# Patient Record
Sex: FEMALE | Race: WHITE | NOT HISPANIC OR LATINO | Employment: OTHER | URBAN - METROPOLITAN AREA
[De-identification: names, ages, dates, MRNs, and addresses within clinical notes are randomized per-mention and may not be internally consistent; named-entity substitution may affect disease eponyms.]

---

## 2017-01-13 ENCOUNTER — ALLSCRIPTS OFFICE VISIT (OUTPATIENT)
Dept: OTHER | Facility: OTHER | Age: 62
End: 2017-01-13

## 2017-01-13 DIAGNOSIS — Z12.31 ENCOUNTER FOR SCREENING MAMMOGRAM FOR MALIGNANT NEOPLASM OF BREAST: ICD-10-CM

## 2017-03-10 ENCOUNTER — HOSPITAL ENCOUNTER (OUTPATIENT)
Dept: MAMMOGRAPHY | Facility: HOSPITAL | Age: 62
Discharge: HOME | End: 2017-03-10
Attending: OBSTETRICS & GYNECOLOGY
Payer: COMMERCIAL

## 2017-03-10 DIAGNOSIS — Z12.31 ENCOUNTER FOR SCREENING MAMMOGRAM FOR MALIGNANT NEOPLASM OF BREAST: ICD-10-CM

## 2017-03-10 PROCEDURE — G0202 SCR MAMMO BI INCL CAD: HCPCS

## 2017-04-19 ENCOUNTER — APPOINTMENT (OUTPATIENT)
Dept: LAB | Facility: CLINIC | Age: 62
End: 2017-04-19
Payer: COMMERCIAL

## 2017-04-19 ENCOUNTER — TRANSCRIBE ORDERS (OUTPATIENT)
Dept: LAB | Facility: CLINIC | Age: 62
End: 2017-04-19

## 2017-04-19 DIAGNOSIS — Z00.00 ROUTINE GENERAL MEDICAL EXAMINATION AT A HEALTH CARE FACILITY: Primary | ICD-10-CM

## 2017-04-19 DIAGNOSIS — Z11.59 SCREENING EXAMINATION FOR POLIOMYELITIS: ICD-10-CM

## 2017-04-19 DIAGNOSIS — Z00.00 ROUTINE GENERAL MEDICAL EXAMINATION AT A HEALTH CARE FACILITY: ICD-10-CM

## 2017-04-19 LAB
CHOLEST SERPL-MCNC: 187 MG/DL (ref 50–200)
GLUCOSE P FAST SERPL-MCNC: 116 MG/DL (ref 65–99)
HCV AB SER QL: NORMAL
HDLC SERPL-MCNC: 64 MG/DL (ref 40–60)
LDLC SERPL CALC-MCNC: 101 MG/DL (ref 0–100)
TRIGL SERPL-MCNC: 112 MG/DL

## 2017-04-19 PROCEDURE — 86803 HEPATITIS C AB TEST: CPT

## 2017-04-19 PROCEDURE — 36415 COLL VENOUS BLD VENIPUNCTURE: CPT

## 2017-04-19 PROCEDURE — 82947 ASSAY GLUCOSE BLOOD QUANT: CPT

## 2017-04-19 PROCEDURE — 80061 LIPID PANEL: CPT

## 2017-07-20 ENCOUNTER — APPOINTMENT (OUTPATIENT)
Dept: LAB | Facility: CLINIC | Age: 62
End: 2017-07-20
Payer: COMMERCIAL

## 2017-07-20 ENCOUNTER — TRANSCRIBE ORDERS (OUTPATIENT)
Dept: LAB | Facility: CLINIC | Age: 62
End: 2017-07-20

## 2017-07-20 DIAGNOSIS — R19.4 FREQUENT BOWEL MOVEMENTS: ICD-10-CM

## 2017-07-20 DIAGNOSIS — R73.01 IMPAIRED FASTING GLUCOSE: Primary | ICD-10-CM

## 2017-07-20 DIAGNOSIS — R73.01 IMPAIRED FASTING GLUCOSE: ICD-10-CM

## 2017-07-20 LAB
BASOPHILS # BLD AUTO: 0.01 THOUSANDS/ΜL (ref 0–0.1)
BASOPHILS NFR BLD AUTO: 0 % (ref 0–1)
EOSINOPHIL # BLD AUTO: 0.09 THOUSAND/ΜL (ref 0–0.61)
EOSINOPHIL NFR BLD AUTO: 2 % (ref 0–6)
ERYTHROCYTE [DISTWIDTH] IN BLOOD BY AUTOMATED COUNT: 12.3 % (ref 11.6–15.1)
GLUCOSE P FAST SERPL-MCNC: 116 MG/DL (ref 65–99)
HCT VFR BLD AUTO: 40.7 % (ref 34.8–46.1)
HGB BLD-MCNC: 13.5 G/DL (ref 11.5–15.4)
LYMPHOCYTES # BLD AUTO: 2.37 THOUSANDS/ΜL (ref 0.6–4.47)
LYMPHOCYTES NFR BLD AUTO: 44 % (ref 14–44)
MCH RBC QN AUTO: 31.3 PG (ref 26.8–34.3)
MCHC RBC AUTO-ENTMCNC: 33.2 G/DL (ref 31.4–37.4)
MCV RBC AUTO: 94 FL (ref 82–98)
MONOCYTES # BLD AUTO: 0.43 THOUSAND/ΜL (ref 0.17–1.22)
MONOCYTES NFR BLD AUTO: 8 % (ref 4–12)
NEUTROPHILS # BLD AUTO: 2.49 THOUSANDS/ΜL (ref 1.85–7.62)
NEUTS SEG NFR BLD AUTO: 46 % (ref 43–75)
PLATELET # BLD AUTO: 242 THOUSANDS/UL (ref 149–390)
PMV BLD AUTO: 10 FL (ref 8.9–12.7)
RBC # BLD AUTO: 4.32 MILLION/UL (ref 3.81–5.12)
WBC # BLD AUTO: 5.39 THOUSAND/UL (ref 4.31–10.16)

## 2017-07-20 PROCEDURE — 85025 COMPLETE CBC W/AUTO DIFF WBC: CPT

## 2017-07-20 PROCEDURE — 36415 COLL VENOUS BLD VENIPUNCTURE: CPT

## 2017-07-20 PROCEDURE — 82947 ASSAY GLUCOSE BLOOD QUANT: CPT

## 2017-07-20 PROCEDURE — 83516 IMMUNOASSAY NONANTIBODY: CPT

## 2017-07-21 LAB — TTG IGA SER-ACNC: <2 U/ML (ref 0–3)

## 2017-08-22 ENCOUNTER — GENERIC CONVERSION - ENCOUNTER (OUTPATIENT)
Dept: OTHER | Facility: OTHER | Age: 62
End: 2017-08-22

## 2017-10-17 ENCOUNTER — GENERIC CONVERSION - ENCOUNTER (OUTPATIENT)
Dept: OTHER | Facility: OTHER | Age: 62
End: 2017-10-17

## 2017-10-17 ENCOUNTER — ANESTHESIA EVENT (OUTPATIENT)
Dept: GASTROENTEROLOGY | Facility: HOSPITAL | Age: 62
End: 2017-10-17
Payer: COMMERCIAL

## 2017-10-17 ENCOUNTER — ANESTHESIA (OUTPATIENT)
Dept: GASTROENTEROLOGY | Facility: HOSPITAL | Age: 62
End: 2017-10-17
Payer: COMMERCIAL

## 2017-10-17 ENCOUNTER — HOSPITAL ENCOUNTER (OUTPATIENT)
Facility: HOSPITAL | Age: 62
Setting detail: OUTPATIENT SURGERY
Discharge: HOME | End: 2017-10-17
Attending: INTERNAL MEDICINE | Admitting: INTERNAL MEDICINE
Payer: COMMERCIAL

## 2017-10-17 VITALS
RESPIRATION RATE: 16 BRPM | OXYGEN SATURATION: 100 % | SYSTOLIC BLOOD PRESSURE: 127 MMHG | HEART RATE: 68 BPM | TEMPERATURE: 97.8 F | WEIGHT: 139 LBS | DIASTOLIC BLOOD PRESSURE: 73 MMHG | HEIGHT: 64 IN | BODY MASS INDEX: 23.73 KG/M2

## 2017-10-17 DIAGNOSIS — Z12.11 ENCOUNTER FOR SCREENING FOR MALIGNANT NEOPLASM OF COLON: ICD-10-CM

## 2017-10-17 PROCEDURE — 88305 TISSUE EXAM BY PATHOLOGIST: CPT | Performed by: INTERNAL MEDICINE

## 2017-10-17 RX ORDER — SODIUM CHLORIDE 9 MG/ML
50 INJECTION, SOLUTION INTRAVENOUS CONTINUOUS
Status: DISCONTINUED | OUTPATIENT
Start: 2017-10-17 | End: 2017-10-17 | Stop reason: HOSPADM

## 2017-10-17 RX ORDER — FLUTICASONE PROPIONATE 50 MCG
1 SPRAY, SUSPENSION (ML) NASAL DAILY
COMMUNITY

## 2017-10-17 RX ORDER — MELATONIN
1000 DAILY
COMMUNITY

## 2017-10-17 RX ORDER — PROPOFOL 10 MG/ML
INJECTION, EMULSION INTRAVENOUS AS NEEDED
Status: DISCONTINUED | OUTPATIENT
Start: 2017-10-17 | End: 2017-10-17 | Stop reason: SURG

## 2017-10-17 RX ORDER — LORATADINE 10 MG/1
10 TABLET ORAL DAILY
COMMUNITY

## 2017-10-17 RX ORDER — PROPOFOL 10 MG/ML
INJECTION, EMULSION INTRAVENOUS CONTINUOUS PRN
Status: DISCONTINUED | OUTPATIENT
Start: 2017-10-17 | End: 2017-10-17 | Stop reason: SURG

## 2017-10-17 RX ADMIN — PROPOFOL 80 MCG/KG/MIN: 10 INJECTION, EMULSION INTRAVENOUS at 11:29

## 2017-10-17 RX ADMIN — PROPOFOL 20 MG: 10 INJECTION, EMULSION INTRAVENOUS at 11:35

## 2017-10-17 RX ADMIN — SODIUM CHLORIDE 50 ML/HR: 0.9 INJECTION, SOLUTION INTRAVENOUS at 09:53

## 2017-10-17 RX ADMIN — PROPOFOL 50 MG: 10 INJECTION, EMULSION INTRAVENOUS at 11:29

## 2017-10-17 NOTE — ANESTHESIA POSTPROCEDURE EVALUATION
Post-Op Assessment Note      CV Status:  Stable    Mental Status:  Alert and awake    Hydration Status:  Euvolemic    PONV Controlled:  Controlled    Airway Patency:  Patent    Post Op Vitals Reviewed: Yes          Staff: Anesthesiologist, CRNA           /75 (10/17/17 1152)    Temp      Pulse 86 (10/17/17 1152)   Resp 16 (10/17/17 1152)    SpO2 100 % (10/17/17 1152)

## 2017-10-17 NOTE — OP NOTE
**** GI/ENDOSCOPY REPORT ****     PATIENT NAME: LEANNE ESPANA ------ VISIT ID:  Patient ID: [id number]   YOB: 1955     INTRODUCTION: Colonoscopy - A 63 female patient presents for an outpatient   Colonoscopy at [facility]  PREVIOUS COLONOSCOPY:     INDICATIONS: Surveillance; previous colonoscopy was 3 or more years from   today  Screening-ADR  CONSENT:  The benefits, risks, and alternatives to the procedure were   discussed and informed consent was obtained from the patient  PREPARATION: EKG, pulse, pulse oximetry and blood pressure were monitored   throughout the procedure  The patient was identified by myself both   verbally and by visual inspection of ID band  Airway Assessment   Classification: Airway class 2 - Visualization of the soft palate, fauces   and uvula  ASA Classification: Class 2 - Patient has mild to moderate   systemic disturbance that may or may not be related to the disorder   requiring surgery  MEDICATIONS: Anesthesia-check records     PROCEDURE:  The endoscope was passed without difficulty through the anus   under direct visualization and advanced to the cecum, confirmed by   appendiceal orifice and ileocecal valve  The scope was withdrawn and the   mucosa was carefully examined  The quality of the preparation was  RECTAL EXAM: Normal rectal exam      FINDINGS:  A single flat polyp, measuring less than 5 mm in size, was   found in the ascending colon  The polyp was completely removed by cold   biopsy polypectomy  The polyp was retrieved  Internal hemorrhoids were   found in the rectum  COMPLICATIONS: There were no complications  IMPRESSIONS: A single flat polyp found in the ascending colon; removed by   cold biopsy polypectomy  Internal hemorrhoids in the rectum  RECOMMENDATIONS: Await biopsy results  Recommend patient have repeat   colonoscopy in five years likely based on pathology results       ESTIMATED BLOOD LOSS:     PATHOLOGY SPECIMENS: Completely removed by cold biopsy polypectomy  PROCEDURE CODES:     ICD-9 Codes: 211 3 Benign neoplasm of colon     ICD-10 Codes: K63 5 Polyp of colon K64 9 Unspecified hemorrhoids     PERFORMED BY: HEATHER Romero  on 10/17/2017  Version 1, electronically signed by HEATHER Maldonado  on   10/17/2017 at 12:17

## 2017-10-17 NOTE — ANESTHESIA PREPROCEDURE EVALUATION
Review of Systems/Medical History  Patient summary reviewed  Chart reviewed  No history of anesthetic complications     Cardiovascular  Exercise tolerance: good,     Pulmonary       GI/Hepatic            Endo/Other     GYN       Hematology   Musculoskeletal       Neurology   Psychology           Physical Exam    Airway    Mallampati score: II  TM Distance: >3 FB  Neck ROM: full     Dental   No notable dental hx     Cardiovascular      Pulmonary      Other Findings        Anesthesia Plan  ASA Score- 2       Anesthesia Type- IV sedation with anesthesia with ASA Monitors  Additional Monitors:   Airway Plan:           Induction- intravenous  Informed Consent- Anesthetic plan and risks discussed with patient  I personally reviewed this patient with the CRNA  Discussed and agreed on the Anesthesia Plan with the CRNA  Rosella Goldmann

## 2017-11-03 ENCOUNTER — GENERIC CONVERSION - ENCOUNTER (OUTPATIENT)
Dept: OTHER | Facility: OTHER | Age: 62
End: 2017-11-03

## 2018-01-11 NOTE — RESULT NOTES
Discussion/Summary   Polyp was a precancerous 1, I recommend repeat colonoscopy in 5 years     Verified Results  (1) TISSUE EXAM [id number] 11:38AM Diego Cruz     Test Name Result Flag Reference   LAB AP CASE REPORT (Report)     Surgical Pathology Report             Case: [id number]                   Authorizing Provider: Francisca Moore DO    Collected:      10/17/2017 1138        Ordering Location:   [address] [place] [address]   Received:      10/17/2017 Ag U  91  Endoscopy                               Pathologist:      Faith Kruger MD                                 Specimen:  Polyp, Colorectal, ascending colon   LAB AP FINAL DIAGNOSIS      A  Ascending colon polyp (biopsy):    - Portions of tubular adenoma  - No high-grade dysplasia or malignancy identified  Electronically signed by Faith Kruger MD on 10/19/2017 at 11:50 AM   LAB AP NOTE      Interpretation performed at [facility], [address] [place] [address], [address] [place] [address]  LAB AP SURGICAL ADDITIONAL INFORMATION (Report)     All controls performed with the immunohistochemical stains reported above   reacted appropriately  These tests were developed and their performance   characteristics determined by [facility] or   [facility]  They may not be cleared or approved by the [place]  Food and Drug Administration  The FDA has determined that such clearance   or approval is not necessary  These tests are used for clinical purposes  They should not be regarded as investigational or for research  This   laboratory has been approved by CLIA 88, designated as a high-complexity   laboratory and is qualified to perform these tests  LAB AP GROSS DESCRIPTION (Report)     A  The specimen is received in formalin, labeled with the patient's name   and hospital number, and is designated ascending colon  The specimen   consists of one tan-pink soft tissue fragment measuring 0 3 cm in greatest   dimension  Entirely submitted  One cassette  Note: The estimated total formalin fixation time based upon information   provided by the submitting clinician and the standard processing schedule   is under 72 hours   MAS   LAB AP CLINICAL INFORMATION      Encounter for screening for malignant neoplasm of colon

## 2018-01-13 VITALS
DIASTOLIC BLOOD PRESSURE: 80 MMHG | BODY MASS INDEX: 25.83 KG/M2 | WEIGHT: 155 LBS | OXYGEN SATURATION: 99 % | SYSTOLIC BLOOD PRESSURE: 132 MMHG | HEART RATE: 66 BPM | HEIGHT: 65 IN

## 2018-01-13 NOTE — MISCELLANEOUS
Dear Dr Miesha Cintron,    The polyp I removed from Mrs Hernandez was a precancerous one  Based on this I recommend a repeat colonoscopy in 5 years    She will be placed on my recall list         Sincerely,         Dylon Mayo DO      Electronically signed by:Ashley Bella DO  Nov  3 2017  3:23PM EST

## 2018-01-16 ENCOUNTER — GENERIC CONVERSION - ENCOUNTER (OUTPATIENT)
Dept: OTHER | Facility: OTHER | Age: 63
End: 2018-01-16

## 2018-01-24 VITALS
HEIGHT: 65 IN | SYSTOLIC BLOOD PRESSURE: 114 MMHG | DIASTOLIC BLOOD PRESSURE: 72 MMHG | WEIGHT: 133.13 LBS | BODY MASS INDEX: 22.18 KG/M2

## 2018-03-10 DIAGNOSIS — Z12.31 ENCOUNTER FOR SCREENING MAMMOGRAM FOR MALIGNANT NEOPLASM OF BREAST: ICD-10-CM

## 2018-03-21 ENCOUNTER — HOSPITAL ENCOUNTER (OUTPATIENT)
Dept: MAMMOGRAPHY | Facility: HOSPITAL | Age: 63
Discharge: HOME | End: 2018-03-21
Attending: OBSTETRICS & GYNECOLOGY
Payer: COMMERCIAL

## 2018-03-21 DIAGNOSIS — Z12.31 ENCOUNTER FOR SCREENING MAMMOGRAM FOR MALIGNANT NEOPLASM OF BREAST: ICD-10-CM

## 2018-03-21 PROCEDURE — 77063 BREAST TOMOSYNTHESIS BI: CPT

## 2018-03-21 PROCEDURE — 77067 SCR MAMMO BI INCL CAD: CPT

## 2018-04-16 ENCOUNTER — OFFICE VISIT (OUTPATIENT)
Dept: URGENT CARE | Age: 63
End: 2018-04-16
Payer: COMMERCIAL

## 2018-04-16 VITALS
HEART RATE: 88 BPM | SYSTOLIC BLOOD PRESSURE: 106 MMHG | OXYGEN SATURATION: 100 % | DIASTOLIC BLOOD PRESSURE: 69 MMHG | WEIGHT: 130 LBS | BODY MASS INDEX: 22.2 KG/M2 | RESPIRATION RATE: 18 BRPM | HEIGHT: 64 IN | TEMPERATURE: 98.9 F

## 2018-04-16 DIAGNOSIS — J06.9 ACUTE URI: Primary | ICD-10-CM

## 2018-04-16 PROCEDURE — 99213 OFFICE O/P EST LOW 20 MIN: CPT | Performed by: FAMILY MEDICINE

## 2018-04-16 RX ORDER — BENZONATATE 100 MG/1
100 CAPSULE ORAL 3 TIMES DAILY PRN
Qty: 20 CAPSULE | Refills: 0 | Status: SHIPPED | OUTPATIENT
Start: 2018-04-16 | End: 2019-01-18 | Stop reason: ALTCHOICE

## 2018-04-16 NOTE — PATIENT INSTRUCTIONS
As we discussed your illness is viral   No antibiotics are indicated at this time  Symptoms do not appear to be consistent with influenza and you have had your symptoms for greater than 48 hours  There is no benefit of testing and starting Tamiflu  Recommend supportive measures at this time  Rest and drink extra fluids  OTC cough and cold medications as needed  Tylenol or Motrin as needed for pain or fever  Salt water gargles and throat lozenges for sore throat  Follow up with PCP if no improvement over next 2-3 days  Go to ER with worsening symptoms  Cold Symptoms   WHAT YOU NEED TO KNOW:   A cold is an infection caused by a virus  The infection causes your upper respiratory system to become inflamed  Common symptoms of a cold include sneezing, dry throat, a stuffy nose, headache, watery eyes, and a cough  Your cough may be dry, or you may cough up mucus  You may also have muscle aches, joint pain, and tiredness  Rarely, you may have a fever  Most colds go away without treatment  DISCHARGE INSTRUCTIONS:   Return to the emergency department if:   · You have increased tiredness and weakness  · You are unable to eat  · Your heart is beating much faster than usual for you  · You see white spots in the back of your throat and your neck is swollen and sore to the touch  · You see pinpoint or larger reddish-purple dots on your skin  Contact your healthcare provider if:   · You have a fever higher than 102°F (38 9°C)  · You have new or worsening shortness of breath  · You have thick nasal drainage for more than 2 days  · Your symptoms do not improve or get worse within 5 days  · You have questions or concerns about your condition or care  Medicines: The following medicines may be suggested by your healthcare provider to decrease your cold symptoms  These medicines are available without a doctor's order  Ask which medicines to take and when to take them   Follow directions  · NSAIDs or acetaminophen  help to bring down a fever or decrease pain  · Decongestants  help decrease nasal stuffiness  · Antihistamines  help decrease sneezing and a runny nose  · Cough suppressants  help decrease how much you cough  · Expectorants  help loosen mucus so you can cough it up  · Take your medicine as directed  Contact your healthcare provider if you think your medicine is not helping or if you have side effects  Tell him of her if you are allergic to any medicine  Keep a list of the medicines, vitamins, and herbs you take  Include the amounts, and when and why you take them  Bring the list or the pill bottles to follow-up visits  Carry your medicine list with you in case of an emergency  Symptom relief: The following may help relieve cold symptoms, such as a dry throat and congestion:  · Gargle with mouthwash or warm salt water as directed  · Suck on throat lozenges or hard candy  · Use a cold or warm vaporizer or humidifier to ease your breathing  · Rest for at least 2 days and then as needed to decrease tiredness and weakness  · Use petroleum based jelly around your nostrils to decrease irritation from blowing your nose  Drink liquids:  Liquids will help thin and loosen thick mucus so you can cough it up  Liquids will also keep you hydrated  Ask your healthcare provider which liquids are best for you and how much to drink each day  Prevent the spread of germs: You can spread your cold germs to others for at least 3 days after your symptoms start  Wash your hands often  Do not share items, such as eating utensils  Cover your nose and mouth when you cough or sneeze using the crook of your elbow instead of your hands  Throw used tissues in the garbage  Do not smoke:  Smoking may worsen your symptoms and increase the length of time you feel sick  Talk with your healthcare provider if you need help to stop smoking    Follow up with your healthcare provider as directed:  Write down your questions so you remember to ask them during your visits  © 2017 [employer] Information is for End User's use only and may not be sold, redistributed or otherwise used for commercial purposes  All illustrations and images included in CareNotes® are the copyrighted property of [employer]  or [facility]  The above information is an  only  It is not intended as medical advice for individual conditions or treatments  Talk to your doctor, nurse or pharmacist before following any medical regimen to see if it is safe and effective for you

## 2018-04-16 NOTE — LETTER
April 16, 2018     Patient: Digna Mares   YOB: 1955   Date of Visit: 4/16/2018       To Whom It May Concern: It is my medical opinion, it is recommended that Digna Mares may return to work on 04/18/2018  If you have any questions or concerns, please don't hesitate to call           Sincerely,        [facility]    CC: No Recipients

## 2018-04-16 NOTE — PROGRESS NOTES
[facility]        NAME: Ana Maria Humphrey is a 63 y o  female  : 1955    MRN: [id number]  DATE: 2018  TIME: 9:58 AM    Assessment and Plan   Acute URI [J06 9]  1  Acute URI  benzonatate (TESSALON PERLES) 100 mg capsule         Patient Instructions     Patient Instructions   As we discussed your illness is viral   No antibiotics are indicated at this time  Symptoms do not appear to be consistent with influenza and you have had your symptoms for greater than 48 hours  There is no benefit of testing and starting Tamiflu  Recommend supportive measures at this time  Rest and drink extra fluids  OTC cough and cold medications as needed  Tylenol or Motrin as needed for pain or fever  Salt water gargles and throat lozenges for sore throat  Follow up with PCP if no improvement over next 2-3 days  Go to ER with worsening symptoms  Chief Complaint     Chief Complaint   Patient presents with    Cold Like Symptoms         History of Present Illness   Ana Maria Humphrey presents to the clinic c/o    This is a 63year old female here today with cough, congestion, body aches  She states symptoms started 2-3 days ago with allergy like symptoms  She had sinus congestion  She states today she woke up with cough and body aches  She denies fever  She complains of ear pain  She states her  had the flu about 1 week ago  She did have her influenza vaccine  She states cough is dry and non productive  Review of Systems   Review of Systems   Constitutional: Positive for activity change and chills  Negative for fatigue and fever  HENT: Positive for congestion, ear pain, sinus pain and sinus pressure  Respiratory: Positive for cough  Negative for chest tightness, shortness of breath and wheezing  Cardiovascular: Negative  Neurological: Negative  Psychiatric/Behavioral: Negative            Current Medications     Long-Term Prescriptions   Medication Sig Dispense Refill    cholecalciferol (VITAMIN D3) 1,000 units tablet Take 1,000 Units by mouth daily      fluticasone (FLONASE) 50 mcg/act nasal spray 1 spray into each nostril daily      loratadine (CLARITIN) 10 mg tablet Take 10 mg by mouth daily         Current Allergies     Allergies as of 04/16/2018 - Reviewed 04/16/2018   Allergen Reaction Noted    Percocet [oxycodone-acetaminophen] GI Intolerance 10/17/2017    Septra [sulfamethoxazole-trimethoprim] GI Intolerance 10/17/2017            The following portions of the patient's history were reviewed and updated as appropriate: allergies, current medications, past family history, past medical history, past social history, past surgical history and problem list     Objective   /69   Pulse 88   Temp 98 9 °F (37 2 °C) (Temporal)   Resp 18   Ht 5' 4" (1 626 m)   Wt 59 kg (130 lb)   LMP  (Exact Date)   SpO2 100%   BMI 22 31 kg/m²        Physical Exam     Physical Exam   Constitutional: She is oriented to person, place, and time  She appears well-developed and well-nourished  HENT:   Right Ear: External ear normal    Left Ear: External ear normal    Serous fluid bilateral ears    Neck: Normal range of motion  Neck supple  Cardiovascular: Normal rate, regular rhythm and normal heart sounds  Pulmonary/Chest: Effort normal    Neurological: She is alert and oriented to person, place, and time  Skin: Skin is warm and dry  Psychiatric: She has a normal mood and affect  Her behavior is normal    Nursing note and vitals reviewed

## 2018-05-17 ENCOUNTER — TRANSCRIBE ORDERS (OUTPATIENT)
Dept: LAB | Facility: CLINIC | Age: 63
End: 2018-05-17

## 2018-05-17 ENCOUNTER — APPOINTMENT (OUTPATIENT)
Dept: LAB | Facility: CLINIC | Age: 63
End: 2018-05-17
Payer: COMMERCIAL

## 2018-05-17 DIAGNOSIS — R73.01 IMPAIRED FASTING GLUCOSE: ICD-10-CM

## 2018-05-17 DIAGNOSIS — R73.01 IMPAIRED FASTING GLUCOSE: Primary | ICD-10-CM

## 2018-05-17 LAB
ANION GAP SERPL CALCULATED.3IONS-SCNC: 9 MMOL/L (ref 4–13)
BUN SERPL-MCNC: 16 MG/DL (ref 5–25)
CALCIUM SERPL-MCNC: 9.2 MG/DL (ref 8.3–10.1)
CHLORIDE SERPL-SCNC: 103 MMOL/L (ref 100–108)
CHOLEST SERPL-MCNC: 159 MG/DL (ref 50–200)
CO2 SERPL-SCNC: 29 MMOL/L (ref 21–32)
CREAT SERPL-MCNC: 0.67 MG/DL (ref 0.6–1.3)
EST. AVERAGE GLUCOSE BLD GHB EST-MCNC: 114 MG/DL
GFR SERPL CREATININE-BSD FRML MDRD: 96 ML/MIN/1.73SQ M
GLUCOSE P FAST SERPL-MCNC: 102 MG/DL (ref 65–99)
HBA1C MFR BLD: 5.6 % (ref 4.2–6.3)
HDLC SERPL-MCNC: 67 MG/DL (ref 40–60)
LDLC SERPL CALC-MCNC: 78 MG/DL (ref 0–100)
NONHDLC SERPL-MCNC: 92 MG/DL
POTASSIUM SERPL-SCNC: 3.8 MMOL/L (ref 3.5–5.3)
SODIUM SERPL-SCNC: 141 MMOL/L (ref 136–145)
TRIGL SERPL-MCNC: 72 MG/DL

## 2018-05-17 PROCEDURE — 36415 COLL VENOUS BLD VENIPUNCTURE: CPT

## 2018-05-17 PROCEDURE — 83036 HEMOGLOBIN GLYCOSYLATED A1C: CPT

## 2018-05-17 PROCEDURE — 80061 LIPID PANEL: CPT

## 2018-05-17 PROCEDURE — 80048 BASIC METABOLIC PNL TOTAL CA: CPT

## 2019-01-18 ENCOUNTER — ANNUAL EXAM (OUTPATIENT)
Dept: OBGYN CLINIC | Facility: CLINIC | Age: 64
End: 2019-01-18
Payer: COMMERCIAL

## 2019-01-18 VITALS
HEIGHT: 64 IN | DIASTOLIC BLOOD PRESSURE: 70 MMHG | SYSTOLIC BLOOD PRESSURE: 112 MMHG | WEIGHT: 132.8 LBS | BODY MASS INDEX: 22.67 KG/M2

## 2019-01-18 DIAGNOSIS — Z12.31 ENCOUNTER FOR SCREENING MAMMOGRAM FOR MALIGNANT NEOPLASM OF BREAST: ICD-10-CM

## 2019-01-18 DIAGNOSIS — Z01.419 WELL FEMALE EXAM WITH ROUTINE GYNECOLOGICAL EXAM: Primary | ICD-10-CM

## 2019-01-18 PROCEDURE — S0612 ANNUAL GYNECOLOGICAL EXAMINA: HCPCS | Performed by: OBSTETRICS & GYNECOLOGY

## 2019-01-18 NOTE — PROGRESS NOTES
ASSESSMENT & PLAN: Yogesh Mtz is a 66 y o  [id number] with normal gynecologic exam     1   Routine well woman exam done today  2    Pap and HPV:Pap with HPV was not done today  Current ASCCP Guidelines reviewed  History of benign hysterectomy  3  Mammogram ordered  Recommend yearly mammography  4   Colonoscopy up-to-date  5  The patient is not sexually active  6  The following were reviewed in today's visit: breast self exam, mammography screening ordered, menopause, osteoporosis, adequate intake of calcium and vitamin D, exercise and healthy diet  7  Patient to return to office in 12 months for annual      All questions have been answered to her satisfaction  CC:  Annual Gynecologic Examination    HPI: Yogesh Mtz is a 66 y o  [id number] who presents for annual gynecologic examination  She has the following concerns:  None    Health Maintenance:    She exercises 4 days per week  She wears her seatbelt routinely  She does perform regular monthly self breast exams  She feels safe at home  Patients does follow a healthy diet  Past Medical History:   Diagnosis Date    Encounter for screening colonoscopy     Fibroid     Seasonal allergies        Past Surgical History:   Procedure Laterality Date    APPENDECTOMY      COLONOSCOPY      HYSTERECTOMY      KNEE ARTHROSCOPY Right     VT COLONOSCOPY FLX DX W/COLLJ SPEC WHEN PFRMD N/A 10/17/2017    Procedure: COLONOSCOPY;  Surgeon: Vitaliy Pickering DO;  Location: [facility]; Service: Gastroenterology    THUMB ARTHROSCOPY      TONSILLECTOMY      WISDOM TOOTH EXTRACTION         Past OB/Gyn History:   No LMP recorded  Patient has had a hysterectomy  Menstrual History:  OB History      Para Term  AB Living    4 3 3   1 3    SAB TAB Ectopic Multiple Live Births    1       3      Menstrual history: Patient is post menopausal    History of sexually transmitted infection No  Patient is not currently sexually active    heterosexual Birth control: postmenopausal      Family History   Problem Relation Age of Onset    No Known Problems Mother     Hypertension Father     Heart attack Father     Melanoma Father     Melanoma Sister        Social History:  Social History     Social History    Marital status: /Civil Union     Spouse name: N/A    Number of children: N/A    Years of education: N/A     Occupational History    Not on file  Social History Main Topics    Smoking status: Never Smoker    Smokeless tobacco: Never Used    Alcohol use No    Drug use: No    Sexual activity: Not Currently     Other Topics Concern    Not on file     Social History Narrative    No narrative on file     Presently lives with family  Patient is currently retired   Allergies   Allergen Reactions    Percocet [Oxycodone-Acetaminophen] GI Intolerance    Septra [Sulfamethoxazole-Trimethoprim] GI Intolerance       Current Outpatient Prescriptions:     cholecalciferol (VITAMIN D3) 1,000 units tablet, Take 1,000 Units by mouth daily, Disp: , Rfl:     fluticasone (FLONASE) 50 mcg/act nasal spray, 1 spray into each nostril daily, Disp: , Rfl:     loratadine (CLARITIN) 10 mg tablet, Take 10 mg by mouth daily, Disp: , Rfl:     Multiple Vitamin (CALCIUM COMPLEX PO), Take 1 tablet by mouth daily, Disp: , Rfl:     Review of Systems:  Review of Systems   Constitutional: Negative  Respiratory: Negative  Cardiovascular: Negative  Gastrointestinal: Negative  Genitourinary: Negative  Neurological: Negative  Psychiatric/Behavioral: Negative  Physical Exam:  /70 (BP Location: Right arm, Patient Position: Sitting, Cuff Size: Standard)   Ht 5' 4 25" (1 632 m)   Wt 60 2 kg (132 lb 12 8 oz)   BMI 22 62 kg/m²    Physical Exam   Constitutional: She is oriented to person, place, and time  She appears well-developed and well-nourished  No distress     Genitourinary: Vagina normal  There is no rash, tenderness, lesion or injury on the right labia  There is no rash, tenderness, lesion or injury on the left labia  Vagina exhibits rugosity  No tenderness or bleeding in the vagina  No vaginal discharge found  Right adnexum does not display mass, does not display tenderness and does not display fullness  Left adnexum does not display mass, does not display tenderness and does not display fullness  Genitourinary Comments: Surgically absent cervix and uterus   HENT:   Head: Normocephalic and atraumatic  Cardiovascular: Normal rate, regular rhythm and normal heart sounds  No murmur heard  Pulmonary/Chest: Effort normal and breath sounds normal  No respiratory distress  Abdominal: Soft  She exhibits no distension  There is no tenderness  Neurological: She is alert and oriented to person, place, and time  Skin: Skin is warm and dry  She is not diaphoretic  Nursing note and vitals reviewed

## 2019-04-10 ENCOUNTER — HOSPITAL ENCOUNTER (OUTPATIENT)
Dept: MAMMOGRAPHY | Facility: HOSPITAL | Age: 64
Discharge: HOME | End: 2019-04-10
Attending: OBSTETRICS & GYNECOLOGY
Payer: COMMERCIAL

## 2019-04-10 VITALS — HEIGHT: 64 IN | WEIGHT: 132 LBS | BODY MASS INDEX: 22.53 KG/M2

## 2019-04-10 DIAGNOSIS — Z12.31 ENCOUNTER FOR SCREENING MAMMOGRAM FOR MALIGNANT NEOPLASM OF BREAST: ICD-10-CM

## 2019-04-10 PROCEDURE — 77067 SCR MAMMO BI INCL CAD: CPT

## 2019-04-10 PROCEDURE — 77063 BREAST TOMOSYNTHESIS BI: CPT

## 2019-07-19 ENCOUNTER — APPOINTMENT (OUTPATIENT)
Dept: LAB | Facility: CLINIC | Age: 64
End: 2019-07-19
Payer: COMMERCIAL

## 2019-07-19 ENCOUNTER — TRANSCRIBE ORDERS (OUTPATIENT)
Dept: LAB | Facility: CLINIC | Age: 64
End: 2019-07-19

## 2019-07-19 DIAGNOSIS — R73.01 IMPAIRED FASTING GLUCOSE: Primary | ICD-10-CM

## 2019-07-19 DIAGNOSIS — R53.83 FATIGUE, UNSPECIFIED TYPE: ICD-10-CM

## 2019-07-19 DIAGNOSIS — R73.01 IMPAIRED FASTING GLUCOSE: ICD-10-CM

## 2019-07-19 LAB
ANION GAP SERPL CALCULATED.3IONS-SCNC: 7 MMOL/L (ref 4–13)
BASOPHILS # BLD AUTO: 0.01 THOUSANDS/ΜL (ref 0–0.1)
BASOPHILS NFR BLD AUTO: 0 % (ref 0–1)
BUN SERPL-MCNC: 17 MG/DL (ref 5–25)
CALCIUM SERPL-MCNC: 8.8 MG/DL (ref 8.3–10.1)
CHLORIDE SERPL-SCNC: 100 MMOL/L (ref 100–108)
CHOLEST SERPL-MCNC: 157 MG/DL (ref 50–200)
CO2 SERPL-SCNC: 28 MMOL/L (ref 21–32)
CREAT SERPL-MCNC: 0.66 MG/DL (ref 0.6–1.3)
EOSINOPHIL # BLD AUTO: 0.06 THOUSAND/ΜL (ref 0–0.61)
EOSINOPHIL NFR BLD AUTO: 1 % (ref 0–6)
ERYTHROCYTE [DISTWIDTH] IN BLOOD BY AUTOMATED COUNT: 11.9 % (ref 11.6–15.1)
EST. AVERAGE GLUCOSE BLD GHB EST-MCNC: 114 MG/DL
GFR SERPL CREATININE-BSD FRML MDRD: 96 ML/MIN/1.73SQ M
GLUCOSE P FAST SERPL-MCNC: 94 MG/DL (ref 65–99)
HBA1C MFR BLD: 5.6 % (ref 4.2–6.3)
HCT VFR BLD AUTO: 40.3 % (ref 34.8–46.1)
HDLC SERPL-MCNC: 72 MG/DL (ref 40–60)
HGB BLD-MCNC: 13.5 G/DL (ref 11.5–15.4)
IMM GRANULOCYTES # BLD AUTO: 0 THOUSAND/UL (ref 0–0.2)
IMM GRANULOCYTES NFR BLD AUTO: 0 % (ref 0–2)
LDLC SERPL CALC-MCNC: 75 MG/DL (ref 0–100)
LYMPHOCYTES # BLD AUTO: 1.81 THOUSANDS/ΜL (ref 0.6–4.47)
LYMPHOCYTES NFR BLD AUTO: 42 % (ref 14–44)
MCH RBC QN AUTO: 32.6 PG (ref 26.8–34.3)
MCHC RBC AUTO-ENTMCNC: 33.5 G/DL (ref 31.4–37.4)
MCV RBC AUTO: 97 FL (ref 82–98)
MONOCYTES # BLD AUTO: 0.46 THOUSAND/ΜL (ref 0.17–1.22)
MONOCYTES NFR BLD AUTO: 11 % (ref 4–12)
NEUTROPHILS # BLD AUTO: 1.97 THOUSANDS/ΜL (ref 1.85–7.62)
NEUTS SEG NFR BLD AUTO: 46 % (ref 43–75)
NONHDLC SERPL-MCNC: 85 MG/DL
NRBC BLD AUTO-RTO: 0 /100 WBCS
PLATELET # BLD AUTO: 222 THOUSANDS/UL (ref 149–390)
PMV BLD AUTO: 10.4 FL (ref 8.9–12.7)
POTASSIUM SERPL-SCNC: 4 MMOL/L (ref 3.5–5.3)
RBC # BLD AUTO: 4.14 MILLION/UL (ref 3.81–5.12)
SODIUM SERPL-SCNC: 135 MMOL/L (ref 136–145)
TRIGL SERPL-MCNC: 48 MG/DL
TSH SERPL DL<=0.05 MIU/L-ACNC: 5.81 UIU/ML (ref 0.36–3.74)
WBC # BLD AUTO: 4.31 THOUSAND/UL (ref 4.31–10.16)

## 2019-07-19 PROCEDURE — 80061 LIPID PANEL: CPT

## 2019-07-19 PROCEDURE — 84443 ASSAY THYROID STIM HORMONE: CPT

## 2019-07-19 PROCEDURE — 80048 BASIC METABOLIC PNL TOTAL CA: CPT

## 2019-07-19 PROCEDURE — 83036 HEMOGLOBIN GLYCOSYLATED A1C: CPT | Performed by: INTERNAL MEDICINE

## 2019-07-19 PROCEDURE — 85025 COMPLETE CBC W/AUTO DIFF WBC: CPT

## 2019-07-19 PROCEDURE — 36415 COLL VENOUS BLD VENIPUNCTURE: CPT

## 2020-03-25 ENCOUNTER — TRANSCRIBE ORDERS (OUTPATIENT)
Dept: ADMINISTRATIVE | Facility: HOSPITAL | Age: 65
End: 2020-03-25

## 2020-03-25 DIAGNOSIS — Z12.31 ENCOUNTER FOR SCREENING MAMMOGRAM FOR MALIGNANT NEOPLASM OF BREAST: Primary | ICD-10-CM

## 2020-10-27 DIAGNOSIS — R05.9 COUGH: ICD-10-CM

## 2020-10-27 PROCEDURE — U0003 INFECTIOUS AGENT DETECTION BY NUCLEIC ACID (DNA OR RNA); SEVERE ACUTE RESPIRATORY SYNDROME CORONAVIRUS 2 (SARS-COV-2) (CORONAVIRUS DISEASE [COVID-19]), AMPLIFIED PROBE TECHNIQUE, MAKING USE OF HIGH THROUGHPUT TECHNOLOGIES AS DESCRIBED BY CMS-2020-01-R: HCPCS | Performed by: INTERNAL MEDICINE

## 2020-10-28 LAB — SARS-COV-2 RNA SPEC QL NAA+PROBE: NOT DETECTED

## 2020-11-09 ENCOUNTER — HOSPITAL ENCOUNTER (OUTPATIENT)
Dept: MAMMOGRAPHY | Facility: HOSPITAL | Age: 65
Discharge: HOME | End: 2020-11-09
Payer: COMMERCIAL

## 2020-11-09 VITALS — HEIGHT: 64 IN | BODY MASS INDEX: 22.53 KG/M2 | WEIGHT: 132 LBS

## 2020-11-09 DIAGNOSIS — Z12.31 ENCOUNTER FOR SCREENING MAMMOGRAM FOR MALIGNANT NEOPLASM OF BREAST: ICD-10-CM

## 2020-11-09 PROCEDURE — 77067 SCR MAMMO BI INCL CAD: CPT

## 2020-11-09 PROCEDURE — 77063 BREAST TOMOSYNTHESIS BI: CPT

## 2021-03-27 ENCOUNTER — IMMUNIZATIONS (OUTPATIENT)
Dept: FAMILY MEDICINE CLINIC | Facility: HOSPITAL | Age: 66
End: 2021-03-27

## 2021-03-27 DIAGNOSIS — Z23 ENCOUNTER FOR IMMUNIZATION: Primary | ICD-10-CM

## 2021-03-27 PROCEDURE — 0001A SARS-COV-2 / COVID-19 MRNA VACCINE (PFIZER-BIONTECH) 30 MCG: CPT

## 2021-03-27 PROCEDURE — 91300 SARS-COV-2 / COVID-19 MRNA VACCINE (PFIZER-BIONTECH) 30 MCG: CPT

## 2021-04-17 ENCOUNTER — IMMUNIZATIONS (OUTPATIENT)
Dept: FAMILY MEDICINE CLINIC | Facility: HOSPITAL | Age: 66
End: 2021-04-17

## 2021-04-17 DIAGNOSIS — Z23 ENCOUNTER FOR IMMUNIZATION: Primary | ICD-10-CM

## 2021-04-17 PROCEDURE — 91300 SARS-COV-2 / COVID-19 MRNA VACCINE (PFIZER-BIONTECH) 30 MCG: CPT

## 2021-04-17 PROCEDURE — 0002A SARS-COV-2 / COVID-19 MRNA VACCINE (PFIZER-BIONTECH) 30 MCG: CPT

## 2021-05-10 DIAGNOSIS — R05.9 COUGH: ICD-10-CM

## 2021-05-10 LAB — SARS-COV-2 RNA RESP QL NAA+PROBE: NEGATIVE

## 2021-05-10 PROCEDURE — U0005 INFEC AGEN DETEC AMPLI PROBE: HCPCS | Performed by: INTERNAL MEDICINE

## 2021-05-10 PROCEDURE — U0003 INFECTIOUS AGENT DETECTION BY NUCLEIC ACID (DNA OR RNA); SEVERE ACUTE RESPIRATORY SYNDROME CORONAVIRUS 2 (SARS-COV-2) (CORONAVIRUS DISEASE [COVID-19]), AMPLIFIED PROBE TECHNIQUE, MAKING USE OF HIGH THROUGHPUT TECHNOLOGIES AS DESCRIBED BY CMS-2020-01-R: HCPCS | Performed by: INTERNAL MEDICINE

## 2021-10-13 ENCOUNTER — APPOINTMENT (OUTPATIENT)
Dept: LAB | Facility: CLINIC | Age: 66
End: 2021-10-13
Payer: COMMERCIAL

## 2021-10-13 DIAGNOSIS — R73.01 IMPAIRED FASTING GLUCOSE: ICD-10-CM

## 2021-10-13 DIAGNOSIS — R79.89 HYPOURICEMIA: ICD-10-CM

## 2021-10-13 LAB
ANION GAP SERPL CALCULATED.3IONS-SCNC: 8 MMOL/L (ref 4–13)
BUN SERPL-MCNC: 16 MG/DL (ref 5–25)
CALCIUM SERPL-MCNC: 8.9 MG/DL (ref 8.3–10.1)
CHLORIDE SERPL-SCNC: 103 MMOL/L (ref 100–108)
CHOLEST SERPL-MCNC: 205 MG/DL (ref 50–200)
CO2 SERPL-SCNC: 26 MMOL/L (ref 21–32)
CREAT SERPL-MCNC: 0.63 MG/DL (ref 0.6–1.3)
EST. AVERAGE GLUCOSE BLD GHB EST-MCNC: 114 MG/DL
GFR SERPL CREATININE-BSD FRML MDRD: 95 ML/MIN/1.73SQ M
GLUCOSE P FAST SERPL-MCNC: 108 MG/DL (ref 65–99)
HBA1C MFR BLD: 5.6 %
HDLC SERPL-MCNC: 89 MG/DL
LDLC SERPL CALC-MCNC: 105 MG/DL (ref 0–100)
NONHDLC SERPL-MCNC: 116 MG/DL
POTASSIUM SERPL-SCNC: 4.2 MMOL/L (ref 3.5–5.3)
SODIUM SERPL-SCNC: 137 MMOL/L (ref 136–145)
T4 FREE SERPL-MCNC: 0.81 NG/DL (ref 0.76–1.46)
TRIGL SERPL-MCNC: 57 MG/DL
TSH SERPL DL<=0.05 MIU/L-ACNC: 7.34 UIU/ML (ref 0.36–3.74)

## 2021-10-13 PROCEDURE — 80048 BASIC METABOLIC PNL TOTAL CA: CPT

## 2021-10-13 PROCEDURE — 36415 COLL VENOUS BLD VENIPUNCTURE: CPT

## 2021-10-13 PROCEDURE — 83036 HEMOGLOBIN GLYCOSYLATED A1C: CPT

## 2021-10-13 PROCEDURE — 84439 ASSAY OF FREE THYROXINE: CPT

## 2021-10-13 PROCEDURE — 84443 ASSAY THYROID STIM HORMONE: CPT

## 2021-10-13 PROCEDURE — 80061 LIPID PANEL: CPT

## 2021-12-01 PROCEDURE — U0005 INFEC AGEN DETEC AMPLI PROBE: HCPCS | Performed by: INTERNAL MEDICINE

## 2021-12-01 PROCEDURE — U0003 INFECTIOUS AGENT DETECTION BY NUCLEIC ACID (DNA OR RNA); SEVERE ACUTE RESPIRATORY SYNDROME CORONAVIRUS 2 (SARS-COV-2) (CORONAVIRUS DISEASE [COVID-19]), AMPLIFIED PROBE TECHNIQUE, MAKING USE OF HIGH THROUGHPUT TECHNOLOGIES AS DESCRIBED BY CMS-2020-01-R: HCPCS | Performed by: INTERNAL MEDICINE

## 2021-12-14 ENCOUNTER — IMMUNIZATIONS (OUTPATIENT)
Dept: FAMILY MEDICINE CLINIC | Facility: HOSPITAL | Age: 66
End: 2021-12-14

## 2021-12-14 DIAGNOSIS — Z23 ENCOUNTER FOR IMMUNIZATION: Primary | ICD-10-CM

## 2021-12-14 PROCEDURE — 91300 COVID-19 PFIZER VACC 0.3 ML: CPT

## 2021-12-14 PROCEDURE — 0001A COVID-19 PFIZER VACC 0.3 ML: CPT

## 2021-12-20 ENCOUNTER — APPOINTMENT (OUTPATIENT)
Dept: LAB | Facility: CLINIC | Age: 66
End: 2021-12-20
Payer: COMMERCIAL

## 2021-12-20 DIAGNOSIS — E03.9 HYPOTHYROIDISM, ADULT: ICD-10-CM

## 2021-12-20 LAB
T4 FREE SERPL-MCNC: 1.09 NG/DL (ref 0.76–1.46)
TSH SERPL DL<=0.05 MIU/L-ACNC: 2.13 UIU/ML (ref 0.36–3.74)

## 2021-12-20 PROCEDURE — 36415 COLL VENOUS BLD VENIPUNCTURE: CPT

## 2021-12-20 PROCEDURE — 84439 ASSAY OF FREE THYROXINE: CPT

## 2021-12-20 PROCEDURE — 86376 MICROSOMAL ANTIBODY EACH: CPT

## 2021-12-20 PROCEDURE — 84443 ASSAY THYROID STIM HORMONE: CPT

## 2021-12-21 LAB — THYROPEROXIDASE AB SERPL-ACNC: 177 IU/ML (ref 0–34)

## 2022-03-08 ENCOUNTER — EVALUATION (OUTPATIENT)
Dept: PHYSICAL THERAPY | Facility: CLINIC | Age: 67
End: 2022-03-08
Payer: COMMERCIAL

## 2022-03-08 DIAGNOSIS — R42 DIZZINESS: ICD-10-CM

## 2022-03-08 PROCEDURE — 97162 PT EVAL MOD COMPLEX 30 MIN: CPT

## 2022-03-08 PROCEDURE — 97112 NEUROMUSCULAR REEDUCATION: CPT

## 2022-03-08 NOTE — PROGRESS NOTES
PT Evaluation     Today's date: 3/8/2022  Patient name: Rk Brian  : 1955  MRN: [id number]  Referring provider: Rick Freeman PA-C  Dx:   Encounter Diagnosis     ICD-10-CM    1  Dizziness  R42 Ambulatory Referral to Physical Therapy                  Assessment  Assessment details: Patient presents to outpatient physical therapy with dizziness starting after COVID-19 infection around [place], with significant increase in dizziness post driving for multiple hours  Patient presents with persistent dizziness, with increases in busy environments, turning head quickly, and with rapid eye movements  She demonstrates positive VOR bilaterally, positive head thrust bilaterally, positive dynamic visual acuity, and positive Fakuda step test to R side  Patient presents with difficulty with MCTSIB condition of foam with eyes closed condition with significant imbalance and sway  These tests show that patient may have vestibular hypofunction post COVID infection  Horizontal saccades and optokinetic movements increase dizziness as well, which may be due to motion sensitivity  Patient would benefit from skilled physical therapy to decrease dizziness, improve functional mobility, improve balance and safety, and return to prior level of functioning  Impairments: abnormal coordination, impaired balance and safety issue  Understanding of Dx/Px/POC: good   Prognosis: good    Goals  ST  Patient will decrease subjective dizziness to at least 5/10 at worst in 4 weeks  2  Further assess balance with FGA and establish appropriate goal   3  Patient will report improved tolerance to ADL activities with reports of mild dizziness in 4 weeks    LT  Patient will reports subjective dizziness to 3/10 at worst in 8 weeks  2  Patient will be able to perform VOR and dynamic visual acuity testing without dizziness in 8 weeks  3   Patient will achieve predicted scoring on FOTO demonstrating return to previous home and work tasks in 8 weeks  4  Patient will be independent in HEP within 8 weeks    Plan  Patient would benefit from: skilled physical therapy  Planned therapy interventions: balance, neuromuscular re-education, patient education, canalith repositioning, home exercise program and therapeutic exercise  Frequency: 2x week  Duration in weeks: 8  Plan of Care beginning date: 3/8/2022  Plan of Care expiration date: 5/3/2022  Treatment plan discussed with: patient        Subjective Evaluation    History of Present Illness  Mechanism of injury: Patient reports that she feels like she is rocking all the time, sometimes making her feel dizzy and nauseous  She states that after driving 2 hours each way, stated she had increased dizziness and nausea and room spinning sensation, constant, not as bad the next day  Ringing in ears is better, once in awhile and not all the time  States that the drive stirred everything up and made it worse (about 1 month ago)  States that she got COVID at [place] and was having issues with dizziness since  Fell walking dogs and twisted ankle, still a little sore and favoring it  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  States too much driving makes things worse, moving head too much, bending over and standing up  States that she always has some dizziness, but movements can increase it for a few minutes  Usually walks 2 miles in morning and evening, has only been walking 1 mile due to ankle pain  Diagnosed with hypothyroidism recently  Wears a prism in her glasses for 20-25 years  Per medical chart, "60 y/o female New to the office with the complaint of tinnitus bilaterally described as ringing (at times a "fog horn" sound) along with dizziness for the last 2 months ever since she had covid-19 end of December  No prior history of dizziness or tinnitus    She describes the dizziness as mostly a woozy sensation, and she does get motion sickness in the car, but she also has periods of vertigo  She had bad episode recently which took several hours to go away but it was after driving to [address] and back  She noticed when she was looking up on a ladder that she had vertigo and fell backwards off a ladder  Notices with with quick head turns as well or looking down  She denies hearing change, and states if anything her hearing is sensitive and more "magnified" now  She denies otalgia, otorrhea, headaches  3/4/2022 Recheck one week after starting prednisone 60 mg a day for a suspected right-sided hearing change and dizziness  She was on prednisone for a few days and then started having right hip pain so she stopped it for 2 days (Because we were concerned about avascular necrosis)  Her symptoms improved and she restarted it Thursday  She believes it was secondary to her ankle injury and has not had any problems since  She was doing well yesterday from the dizziness perspective and the tinnitus was better, but she woke up this morning feeling foggy, dizzy, blurred vision and decreased focus  Her ears seem a little stuffy left over right  She notices the dizziness when she is girls on her phone  Sensitivity and hearing has improved  She has not had her vision checked recently  She has not tried Dramamine or meclizine for this but does have it since she used to use it for boat rides  No recent cruises or boating  She defers repeat hearing testing today because she feels her hearing is fine  Epley maneuver done last week and no improvement "  Pain  Current pain ratin  At best pain ratin  At worst pain ratin  Location: R ankle and R hip    Social Support  Steps to enter house: yes  6  Stairs in house: yes   12  Lives in: multiple-level home  Lives with: spouse (2 dogs)          Objective     Concurrent Complaints  Positive for headaches, nausea/motion sickness, tinnitus and visual change (fuzziness while driving, reading)   Negative for hearing loss  Neuro Exam:     Dizziness  Positive for disequilibrium, vertigo (just the one night), motion sickness, light-headedness (bend over and stand up) and rocking or swaying  Negative for oscillopsia and diplopia  Exacerbating factors  Positive for bending over, rolling in bed (sometimes makes it better, sometimes worse), walking (sometimes worse, sometimes better), turning head, optokinetic movement (scrolling on phone, in car) and walking in busy environment  Symptoms   Intensity at best: 1/10  Intensity at worst: 9/10  Average intensity: 3/10    Headaches   Patient reports headaches: Yes     Frequency: 1 every 2 weeks  Duration: few hours    Cervical exam   Modified VBI   Left: asymptomatic  Right: asymptomatic    Oculomotor exam   Oculomotor ROM: WNL  Resting nystagmus: not present   Gaze holding nystagmus: not present left  and not present right  Smooth pursuits: within normal limits  Vertical saccades: normal  Horizontal saccades: normal and dizziness to L side  Convergence: decreased convergence on R eye, did not see double, only blurry  Convergence: abnormal  Head thrust: left abnormal and right abnormal    Positional testing   Positional testing comment: Fakuda step test + R side  Dynamic Visual Acuity: 4 lines difference, significant dizziness      Balance assessments   MCTSIB   Eyes open level surface: 30  Eyes open foam surface: 30  Eyes closed level surface: 30  Eyes closed foam surface: 30, significant sway        Precautions: recent ankle sprain, OA      Manuals 3/8                                                                Neuro Re-Ed             FGA NV             Seated VOR x 1             Seated saccades             Smooth pursuit cw/ccw             Foam EC                                       Ther Ex                                                                                                                     Ther Activity                                       Gait Training Modalities Results of testing, VOR and impulse testing, DVA, COVID disease specific edu

## 2022-03-09 ENCOUNTER — OFFICE VISIT (OUTPATIENT)
Dept: PHYSICAL THERAPY | Facility: CLINIC | Age: 67
End: 2022-03-09
Payer: COMMERCIAL

## 2022-03-09 DIAGNOSIS — R42 DIZZINESS: Primary | ICD-10-CM

## 2022-03-09 PROCEDURE — 97112 NEUROMUSCULAR REEDUCATION: CPT

## 2022-03-09 NOTE — PROGRESS NOTES
Daily Note     Today's date: 3/9/2022  Patient name: Jaswant Benitez  : 1955  MRN: [id number]  Referring provider: Perla Mccartney PA-C  Dx:   Encounter Diagnosis     ICD-10-CM    1  Dizziness  R42        Start Time: 0800  Stop Time: 0900  Total time in clinic (min): 60 minutes    Subjective: Patient reports she felt a little woozy with a little more dizziness after yesterday's session for a few hours, then states she felt a little better  Dizziness is about 2/10 today  She states that there was more traffic on the way here, made her a little more dizzy  Objective: See treatment diary below      Assessment: Tolerated treatment well  Patient demonstrated fatigue post treatment, exhibited good technique with therapeutic exercises and would benefit from continued PT  Patient with dizziness up to 3-4/10 with seated exercises, more with horizontal movements more than vertical  Patient scored 27/30 on FGA, with most difficulty with walking with EC and walking with head movements  Given HEP with seated VOR, saccades, and smooth pursuit, and VOR Cx exercises  Continue to progress as tolerated  Plan: Continue per plan of care        Short Term Goal Expiration Date:(2022)  Long Term Goal Expiration Date: (5/3/2022)  POC Expiration Date: (5/3/2022)         Precautions: recent ankle sprain, OA        Manuals 3/8  3/9                                                                                                                   Neuro Re-Ed                       FGA NV   27/30                   Seated VOR x 1   Seated, plain horizontal/vertical 30" x 3 each                   Seated smooth pursuits    seated, plain horizontal/vertical 30" x 3 each                   Saccades     seated, plain, horizontal/vertical 30" x 3 each                   Foam EC                        VOR Cx    seated, plain, horiztonal/ vertical 30" x 3 each                                           Ther Ex                                                                                                                                                                                                                       Ther Activity                                                                       Gait Training                                                                       Modalities Results of testing, VOR and impulse testing, DVA, COVID disease specific edu                                                                       Access Code: [email or web address]  URL: [email or web address]  Date: 03/09/2022  Prepared by: Curley Alpers    Exercises  · Seated Gaze Stabilization with Head Rotation - 2 x daily - 7 x weekly - 3 sets - 30 hold  · Seated Gaze Stabilization with Head Nod - 2 x daily - 7 x weekly - 3 sets - 30 hold  · Seated Vertical Smooth Pursuit - 2 x daily - 7 x weekly - 3 sets - 30 hold  · Seated Horizontal Smooth Pursuit - 2 x daily - 7 x weekly - 3 sets - 30 hold  · Seated VOR Cancellation - 2 x daily - 7 x weekly - 3 sets - 30 hold  · Seated Horizontal Saccades - 2 x daily - 7 x weekly - 3 sets - 30 hold  · Seated Vertical Saccades - 2 x daily - 7 x weekly - 3 sets - 30 hold

## 2022-03-14 ENCOUNTER — OFFICE VISIT (OUTPATIENT)
Dept: PHYSICAL THERAPY | Facility: CLINIC | Age: 67
End: 2022-03-14
Payer: COMMERCIAL

## 2022-03-14 DIAGNOSIS — R42 DIZZINESS: Primary | ICD-10-CM

## 2022-03-14 PROCEDURE — 97110 THERAPEUTIC EXERCISES: CPT

## 2022-03-14 PROCEDURE — 97112 NEUROMUSCULAR REEDUCATION: CPT
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
## 2022-03-16 ENCOUNTER — OFFICE VISIT (OUTPATIENT)
Dept: PHYSICAL THERAPY | Facility: CLINIC | Age: 67
End: 2022-03-16
Payer: COMMERCIAL

## 2022-03-16 DIAGNOSIS — R42 DIZZINESS: Primary | ICD-10-CM

## 2022-03-16 PROCEDURE — 97112 NEUROMUSCULAR REEDUCATION: CPT

## 2022-03-16 PROCEDURE — 97110 THERAPEUTIC EXERCISES: CPT

## 2022-03-16 NOTE — PROGRESS NOTES
Daily Note     Today's date: 3/16/2022  Patient name: Meaghan Hess  : 1955  MRN: [id number]  Referring provider: Donovan Cr PA-C  Dx:   Encounter Diagnosis     ICD-10-CM    1  Dizziness  R42                   Subjective: Patient reports she felt a little "off and yucky" after last session until the evening time, and then dwindled away  She states she if feeling pretty good this morning  She states that driving still bothers her  She states that she did more driving yesterday, which bothered her some but she felt better afterwards  Objective: See treatment diary below      Assessment: Tolerated treatment well  Patient demonstrated fatigue post treatment, exhibited good technique with therapeutic exercises and would benefit from continued PT  Most dizziness with VOR x 1 horizontally  Kept exercises the same due to patient feeling increased dizziness for rest of day last session  Progress as tolerated  Next time will try some optokinetic movement on treadmill with watching cars drive by  Will also attempt virtual reality with grocery shopping/driving simulation as tolerated  Plan: Continue per plan of care        Short Term Goal Expiration Date:(2022)  Long Term Goal Expiration Date: (5/3/2022)  POC Expiration Date: (5/3/2022)         Precautions: recent ankle sprain, OA        Manuals 3/8  3/9  3/14  3/16                                                                                           Neuro Re-Ed                   FGA NV   /               Seated VOR x 1   Seated, plain horizontal/vertical 30" x 3 each  Standing, plain horiztonal/vertical 30" x 3  standing, plain horiztonal/vertical 30" x 3           Seated smooth pursuits    seated, plain horizontal/vertical 30" x 3 each  smooth pursuit [place] CW/CCW standing, plain, 30" x 3 each  CW/CCW standing, plain, 30" x 3 each           Saccades     seated, plain, horizontal/vertical 30" x 3 each               Foam EC      NBOS, 30" x 3              VOR Cx    seated, plain, horiztonal/ vertical 30" x 3 each  standing, plain, horizontal/vertical 30" x 3 each  standing, plan, horiztonal/vertical 30" x 3 each           amb 360 degrees           EC HT/HN   FTEC 30" x 3 each FTEC 30" x 3 each       Foam beams   Tandem x 3 laps, sideways with HN 3 laps Tandem x 3 laps, sideways with HN x 3 laps        Amb with HN/HN                   Ther Ex                          TM 2 2-2 5mph with BUE's, 10 min, HT/HN 2 min each  TM  2  7mph with BUE's for 12 minutes, HT for 3 minutes                                                                                                                                                       Ther Activity                                                           Gait Training                                                           Modalities Results of testing, VOR and impulse testing, DVA, COVID disease specific edu                                                           Access Code: [email or web address]  URL: [email or web address]  Date: 03/09/2022  Prepared by: Ayah Lind    Exercises  · Seated Gaze Stabilization with Head Rotation - 2 x daily - 7 x weekly - 3 sets - 30 hold  · Seated Gaze Stabilization with Head Nod - 2 x daily - 7 x weekly - 3 sets - 30 hold  · Seated Vertical Smooth Pursuit - 2 x daily - 7 x weekly - 3 sets - 30 hold  · Seated Horizontal Smooth Pursuit - 2 x daily - 7 x weekly - 3 sets - 30 hold  · Seated VOR Cancellation - 2 x daily - 7 x weekly - 3 sets - 30 hold  · Seated Horizontal Saccades - 2 x daily - 7 x weekly - 3 sets - 30 hold  · Seated Vertical Saccades - 2 x daily - 7 x weekly - 3 sets - 30 hold

## 2022-03-21 ENCOUNTER — OFFICE VISIT (OUTPATIENT)
Dept: PHYSICAL THERAPY | Facility: CLINIC | Age: 67
End: 2022-03-21
Payer: COMMERCIAL

## 2022-03-21 DIAGNOSIS — R42 DIZZINESS: Primary | ICD-10-CM

## 2022-03-21 PROCEDURE — 97110 THERAPEUTIC EXERCISES: CPT

## 2022-03-21 PROCEDURE — 97112 NEUROMUSCULAR REEDUCATION: CPT

## 2022-03-21 NOTE — PROGRESS NOTES
Daily Note     Today's date: 3/21/2022  Patient name: Piedad Porter  : 1955  MRN: [id number]  Referring provider: Jimym Puga PA-C  Dx:   Encounter Diagnosis     ICD-10-CM    1  Dizziness  R42                   Subjective:  Patient reports that that she felt ok after last session, she went to [place] after last session and "it didn't make me crazy like the last time "      Objective: See treatment diary below      Assessment: Increased VOR and VOR Cx to busier environment and tolerated well  Added VOR x 2 with busy background  Some dizziness with performing 360 degree turns and VOR x 1 while ambulating  Tolerated treatment well  Patient demonstrated fatigue post treatment, exhibited good technique with therapeutic exercises and would benefit from continued PT  Will attempt virtual reality next session with busy environment and grocery store  Continue to progress as tolerated  Plan: Continue per plan of care        Short Term Goal Expiration Date:(2022)  Long Term Goal Expiration Date: (5/3/2022)  POC Expiration Date: (5/3/2022)         Precautions: recent ankle sprain, OA        Manuals 3/8  3/9  3/14  3/16  3/21                                                                                         Neuro Re-Ed                   FGA NV   27/30               Seated VOR x 1   Seated, plain horizontal/vertical 30" x 3 each  Standing, plain horiztonal/vertical 30" x 3  standing, plain horiztonal/vertical 30" x 3  standing, busy,horiztonal/vertical 30" x 3 each    Walking VOR x 1 hallway          VOR x 2     Standing, busy, horiztonal/vertical 30" x 3 each      Seated smooth pursuits    seated, plain horizontal/vertical 30" x 3 each  smooth pursuit [place] CW/CCW standing, plain, 30" x 3 each  CW/CCW standing, plain, 30" x 3 each           Saccades     seated, plain, horizontal/vertical 30" x 3 each               Foam EC      NBOS, 30" x 3             VOR Cx    seated, plain, horiztonal/ vertical 30" x 3 each  standing, plain, horizontal/vertical 30" x 3 each  standing, plan, horiztonal/vertical 30" x 3 each Standing, busy, horiztonal/vertical 30" x 3 each         amb 360 degrees     2 laps hallway      EC HT/HN   FTEC 30" x 3 each FTEC 30" x 3 each FTEC foam, HT/HN 3 x 30" each      Foam beams   Tandem x 3 laps, sideways with HN 3 laps Tandem x 3 laps, sideways with HN x 3 laps Tandem x 3 laps, sideways with HN x 2 laps       Amb with HN/HN                   Ther Ex                          TM 2 2-2 5mph with BUE's, 10 min, HT/HN 2 min each  TM  2  7mph with BUE's for 12 minutes, HT for 3 minutes  TM  2  7mph with BUE's for 14 minutes, HT/HN for 1 minute intervals                                                                                                                                                     Ther Activity                                                           Gait Training                                                           Modalities Results of testing, VOR and impulse testing, DVA, COVID disease specific edu                                                           Access Code: [email or web address]  URL: [email or web address]  Date: 03/09/2022  Prepared by: Ayah Lind    Exercises  · Seated Gaze Stabilization with Head Rotation - 2 x daily - 7 x weekly - 3 sets - 30 hold  · Seated Gaze Stabilization with Head Nod - 2 x daily - 7 x weekly - 3 sets - 30 hold  · Seated Vertical Smooth Pursuit - 2 x daily - 7 x weekly - 3 sets - 30 hold  · Seated Horizontal Smooth Pursuit - 2 x daily - 7 x weekly - 3 sets - 30 hold  · Seated VOR Cancellation - 2 x daily - 7 x weekly - 3 sets - 30 hold  · Seated Horizontal Saccades - 2 x daily - 7 x weekly - 3 sets - 30 hold  · Seated Vertical Saccades - 2 x daily - 7 x weekly - 3 sets - 30 hold

## 2022-03-23 ENCOUNTER — APPOINTMENT (OUTPATIENT)
Dept: PHYSICAL THERAPY | Facility: CLINIC | Age: 67
End: 2022-03-23
Payer: COMMERCIAL

## 2022-03-25 ENCOUNTER — OFFICE VISIT (OUTPATIENT)
Dept: PHYSICAL THERAPY | Facility: CLINIC | Age: 67
End: 2022-03-25
Payer: COMMERCIAL

## 2022-03-25 DIAGNOSIS — R42 DIZZINESS: Primary | ICD-10-CM

## 2022-03-25 PROCEDURE — 97112 NEUROMUSCULAR REEDUCATION: CPT

## 2022-03-25 NOTE — PROGRESS NOTES
Daily Note     Today's date: 3/25/2022  Patient name: Shun Zayas  : 1955  MRN: [id number]  Referring provider: Radha Faria PA-C  Dx:   Encounter Diagnosis     ICD-10-CM    1  Dizziness  R42                   Subjective:  Patient reports that doing some exercises training her dog continue to make her feel dizzy, usually when she is looking at the floor  Objective: See treatment diary below      Assessment: Patient tolerated treatment session well  Added tracking moving cars with his head and eyes during session on the treadmill, with mild dizziness  Added exercises specific to her dog training, walking in figure-8's, spirals, and 360 degree turns while looking at floor to replicate with mild dizziness  Also trialed virtual reality seated with car simulation, patient with some dizziness and some nausea with simulation with turns and quick stops  Continue to progress as tolerated  Plan: Continue per plan of care        Short Term Goal Expiration Date:(2022)  Long Term Goal Expiration Date: (5/3/2022)  POC Expiration Date: (5/3/2022)         Precautions: recent ankle sprain, OA        Manuals 3/8  3/9  3/14  3/16  3/21  3/25                                                                                       Neuro Re-Ed                   FGA NV   27/30               Seated VOR x 1   Seated, plain horizontal/vertical 30" x 3 each  Standing, plain horiztonal/vertical 30" x 3  standing, plain horiztonal/vertical 30" x 3  standing, busy,horiztonal/vertical 30" x 3 each    Walking VOR x 1 hallway   seated on PB bouncing up and down 30" x 3        VOR x 2     Standing, busy, horiztonal/vertical 30" x 3 each      Seated smooth pursuits    seated, plain horizontal/vertical 30" x 3 each  smooth pursuit [place] CW/CCW standing, plain, 30" x 3 each  CW/CCW standing, plain, 30" x 3 each    CW/CCW standing, busy 30" x 2 each       Saccades     seated, plain, horizontal/vertical 30" x 3 each             Foam EC      NBOS, 30" x 3             VOR Cx    seated, plain, horiztonal/ vertical 30" x 3 each  standing, plain, horizontal/vertical 30" x 3 each  standing, plan, horiztonal/vertical 30" x 3 each Standing, busy, horiztonal/vertical 30" x 3 each  standing, busy, horiztonal/vertical 30" x 3 each       amb 360 degrees     2 laps hallway Figure 8's, spirals, and 360 degree turns around cones in hallway x 2-3 each direction each     EC HT/HN   FTEC 30" x 3 each FTEC 30" x 3 each FTEC foam, HT/HN 3 x 30" each      Foam beams   Tandem x 3 laps, sideways with HN 3 laps Tandem x 3 laps, sideways with HN x 3 laps Tandem x 3 laps, sideways with HN x 2 laps       Virtualis            car simulation easy, seated passenger seat, speed 0 75-0 9 speed       Ther Ex                          TM 2 2-2 5mph with BUE's, 10 min, HT/HN 2 min each  TM  2  7mph with BUE's for 12 minutes, HT for 3 minutes  TM  2  7mph with BUE's for 14 minutes, HT/HN for 1 minute intervals  TM  2  8mph with BUE for  Following cars with eyes and head out window, 15 minutes                                                                                                                                                   Ther Activity                                                           Gait Training                                                           Modalities Results of testing, VOR and impulse testing, DVA, COVID disease specific edu                                                           Access Code: [email or web address]  URL: [email or web address]  Date: 03/09/2022  Prepared by: Meagan Thomas    Exercises  · Seated Gaze Stabilization with Head Rotation - 2 x daily - 7 x weekly - 3 sets - 30 hold  · Seated Gaze Stabilization with Head Nod - 2 x daily - 7 x weekly - 3 sets - 30 hold  · Seated Vertical Smooth Pursuit - 2 x daily - 7 x weekly - 3 sets - 30 hold  · Seated Horizontal Smooth Pursuit - 2 x daily - 7 x weekly - 3 sets - 30 hold  · Seated VOR Cancellation - 2 x daily - 7 x weekly - 3 sets - 30 hold  · Seated Horizontal Saccades - 2 x daily - 7 x weekly - 3 sets - 30 hold  · Seated Vertical Saccades - 2 x daily - 7 x weekly - 3 sets - 30 hold

## 2022-03-28 ENCOUNTER — OFFICE VISIT (OUTPATIENT)
Dept: PHYSICAL THERAPY | Facility: CLINIC | Age: 67
End: 2022-03-28
Payer: COMMERCIAL

## 2022-03-28 DIAGNOSIS — R42 DIZZINESS: Primary | ICD-10-CM

## 2022-03-28 PROCEDURE — 97112 NEUROMUSCULAR REEDUCATION: CPT

## 2022-03-28 NOTE — PROGRESS NOTES
Daily Note     Today's date: 3/28/2022  Patient name: Jhon Tavarez  : 1955  MRN: [id number]  Referring provider: Lucien Agustin PA-C  Dx:   Encounter Diagnosis     ICD-10-CM    1  Dizziness  R42                   Subjective:  Patient reports she was the passenger in the car over the weekend and it wasn't as bad as the car simulator  She states her stomach was upset afterwards like car sickness  Objective: See treatment diary below      Assessment: Patient tolerated treatment session well  Continues to have slight dizziness with tracking moving cars with his head and eyes during session on the treadmill  Some dizziness with head movement during grocery store activity with noted ankle strategy to maintain balance  Continue to progress as tolerated  Try video of grocery store gaze stabilization for next session on large screen    Plan: Continue per plan of care        Short Term Goal Expiration Date:(2022)  Long Term Goal Expiration Date: (5/3/2022)  POC Expiration Date: (5/3/2022)         Precautions: recent ankle sprain, OA        Manuals 3/8  3/9  3/14  3/16  3/21  3/25  3/28                                                                                     Neuro Re-Ed                   FGA NV                  Seated VOR x 1   Seated, plain horizontal/vertical 30" x 3 each  Standing, plain horiztonal/vertical 30" x 3  standing, plain horiztonal/vertical 30" x 3  standing, busy,horiztonal/vertical 30" x 3 each    Walking VOR x 1 hallway   seated on PB bouncing up and down 30" x 3        VOR x 2     Standing, busy, horiztonal/vertical 30" x 3 each      Seated smooth pursuits    seated, plain horizontal/vertical 30" x 3 each  smooth pursuit [place] CW/CCW standing, plain, 30" x 3 each  CW/CCW standing, plain, 30" x 3 each    CW/CCW standing, busy 30" x 2 each       Saccades     seated, plain, horizontal/vertical 30" x 3 each               Foam EC      NBOS, 30" x 3       foam NBOS, bending forward to  cones to side and passing behind to therapist 10 cones each side  Standing on black pads x 10 each side with twist behind      VOR Cx    seated, plain, horiztonal/ vertical 30" x 3 each  standing, plain, horizontal/vertical 30" x 3 each  standing, plan, horiztonal/vertical 30" x 3 each Standing, busy, horiztonal/vertical 30" x 3 each  standing, busy, horiztonal/vertical 30" x 3 each       amb 360 degrees     2 laps hallway Figure 8's, spirals, and 360 degree turns around cones in hallway x 2-3 each direction each Figure 8's x 5 laps, spirals x 3 each direction, 360 degree turns around cones 1 lap each direction    amb with 360 degree turns with head mvt, x 3 laps     EC HT/HN   FTEC 30" x 3 each FTEC 30" x 3 each FTEC foam, HT/HN 3 x 30" each      Foam beams   Tandem x 3 laps, sideways with HN 3 laps Tandem x 3 laps, sideways with HN x 3 laps Tandem x 3 laps, sideways with HN x 2 laps       Virtualis            car simulation easy, seated passenger seat, speed 0 75-0 9 speed Crowd VR, shopping center % crowd density x 5 min  Grocery shopping x 12 minutes     Ther Ex                          TM 2 2-2 5mph with BUE's, 10 min, HT/HN 2 min each  TM  2  7mph with BUE's for 12 minutes, HT for 3 minutes  TM  2  7mph with BUE's for 14 minutes, HT/HN for 1 minute intervals  TM  2  8mph with BUE for  Following cars with eyes and head out window, 15 minutes  TM   2  8mph with BUE, following cars with eyes and head outside window, 15 minutes                                                                                                                                                 Ther Activity                                                           Gait Training                                                           Modalities Results of testing, VOR and impulse testing, DVA, COVID disease specific edu                                                           Access Code: [email or web address]  URL: [email or web address]  Date: 03/09/2022  Prepared by: Amelia Moser    Exercises  · Seated Gaze Stabilization with Head Rotation - 2 x daily - 7 x weekly - 3 sets - 30 hold  · Seated Gaze Stabilization with Head Nod - 2 x daily - 7 x weekly - 3 sets - 30 hold  · Seated Vertical Smooth Pursuit - 2 x daily - 7 x weekly - 3 sets - 30 hold  · Seated Horizontal Smooth Pursuit - 2 x daily - 7 x weekly - 3 sets - 30 hold  · Seated VOR Cancellation - 2 x daily - 7 x weekly - 3 sets - 30 hold  · Seated Horizontal Saccades - 2 x daily - 7 x weekly - 3 sets - 30 hold  · Seated Vertical Saccades - 2 x daily - 7 x weekly - 3 sets - 30 hold

## 2022-03-30 ENCOUNTER — OFFICE VISIT (OUTPATIENT)
Dept: PHYSICAL THERAPY | Facility: CLINIC | Age: 67
End: 2022-03-30
Payer: COMMERCIAL

## 2022-03-30 DIAGNOSIS — R42 DIZZINESS: Primary | ICD-10-CM

## 2022-03-30 PROCEDURE — 97112 NEUROMUSCULAR REEDUCATION: CPT

## 2022-03-30 NOTE — PROGRESS NOTES
PROGRESS NOTE    Today's date: 3/8/2022  Patient name: Chucky Alfred  : 1955  MRN: [id number]  Referring provider: Sudarshan Bowens PA-C  Dx:   Encounter Diagnosis     ICD-10-CM    1  Dizziness  R42 Ambulatory Referral to Physical Therapy                  Assessment  3/30: Patient is progressing well with therapy, and has attended 8 sessions of physical therapy at this time  Constant dizziness has subsided and she is not getting as dizzy with daily activities  She continues to have some dizziness in busy environments at this time (grocery store) and with quick turns and movements however less intense  Patient has not returned to driving long distances, and at this time has avoided long drives due to it bothering her the last time  Her dynamic balance with FGA testing has improved  Patient would benefit from continued physical therapy to decrease motion sensitivity, and return to function of dog training drills, shopping for groceries without dizziness, and returning to driving without dizziness  Continue per POC, decrease visits to 1x/week to preserve visits for the year due to insurance  Assessment details: Patient presents to outpatient physical therapy with dizziness starting after COVID-19 infection around [place], with significant increase in dizziness post driving for multiple hours  Patient presents with persistent dizziness, with increases in busy environments, turning head quickly, and with rapid eye movements  She demonstrates positive VOR bilaterally, positive head thrust bilaterally, positive dynamic visual acuity, and positive Fakuda step test to R side  Patient presents with difficulty with MCTSIB condition of foam with eyes closed condition with significant imbalance and sway  These tests show that patient may have vestibular hypofunction post COVID infection  Horizontal saccades and optokinetic movements increase dizziness as well, which may be due to motion sensitivity    Patient would benefit from skilled physical therapy to decrease dizziness, improve functional mobility, improve balance and safety, and return to prior level of functioning  Impairments: abnormal coordination, impaired balance and safety issue  Understanding of Dx/Px/POC: good   Prognosis: good    Goals  ST  Patient will decrease subjective dizziness to at least 5/10 at worst in 4 weeks MET  2  Further assess balance with FGA and establish appropriate goal MET  3  Patient will report improved tolerance to ADL activities with reports of mild dizziness in 4 weeks MET    LT  Patient will reports subjective dizziness to 3/10 at worst in 8 weeks MET   2  Patient will be able to perform VOR and dynamic visual acuity testing without dizziness in 8 weeks PROGRESSING  3  Patient will achieve predicted scoring on FOTO demonstrating return to previous home and work tasks in 8 weeks  4  Patient will be independent in HEP within 8 weeks [id number]  Patient would benefit from: skilled physical therapy  Planned therapy interventions: balance, neuromuscular re-education, patient education, canalith repositioning, home exercise program and therapeutic exercise  Frequency: 2x week  Duration in weeks: 8  Plan of Care beginning date: 3/8/2022  Plan of Care expiration date: 5/3/2022  Treatment plan discussed with: patient        Subjective Evaluation    History of Present Illness  Dizziness has been a lot better, dizziness has been about 0/10, at worst 2/10  Provoking symptoms are shopping at the store, drills with dog training when looking down  Driving is much better, but haven't gone a long distance yet  Mechanism of injury: Patient reports that she feels like she is rocking all the time, sometimes making her feel dizzy and nauseous  She states that after driving 2 hours each way, stated she had increased dizziness and nausea and room spinning sensation, constant, not as bad the next day   Ringing in ears is better, once in awhile and not all the time  States that the drive stirred everything up and made it worse (about 1 month ago)  States that she got COVID at [place] and was having issues with dizziness since  Fell walking dogs and twisted ankle, still a little sore and favoring it  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  States too much driving makes things worse, moving head too much, bending over and standing up  States that she always has some dizziness, but movements can increase it for a few minutes  Usually walks 2 miles in morning and evening, has only been walking 1 mile due to ankle pain  Diagnosed with hypothyroidism recently  Wears a prism in her glasses for 20-25 years  Per medical chart, "62 y/o female New to the office with the complaint of tinnitus bilaterally described as ringing (at times a "fog horn" sound) along with dizziness for the last 2 months ever since she had covid-19 end of December  No prior history of dizziness or tinnitus  She describes the dizziness as mostly a woozy sensation, and she does get motion sickness in the car, but she also has periods of vertigo  She had bad episode recently which took several hours to go away but it was after driving to [address] and back  She noticed when she was looking up on a ladder that she had vertigo and fell backwards off a ladder  Notices with with quick head turns as well or looking down  She denies hearing change, and states if anything her hearing is sensitive and more "magnified" now  She denies otalgia, otorrhea, headaches  3/4/2022 Recheck one week after starting prednisone 60 mg a day for a suspected right-sided hearing change and dizziness  She was on prednisone for a few days and then started having right hip pain so she stopped it for 2 days (Because we were concerned about avascular necrosis)  Her symptoms improved and she restarted it Thursday    She believes it was secondary to her ankle injury and has not had any problems since  She was doing well yesterday from the dizziness perspective and the tinnitus was better, but she woke up this morning feeling foggy, dizzy, blurred vision and decreased focus  Her ears seem a little stuffy left over right  She notices the dizziness when she is girls on her phone  Sensitivity and hearing has improved  She has not had her vision checked recently  She has not tried Dramamine or meclizine for this but does have it since she used to use it for boat rides  No recent cruises or boating  She defers repeat hearing testing today because she feels her hearing is fine  Epley maneuver done last week and no improvement "  Pain  Current pain ratin  At best pain ratin  At worst pain ratin  Location: R ankle and R hip    Social Support  Steps to enter house: yes  6  Stairs in house: yes   12  Lives in: multiple-level home  Lives with: spouse (2 dogs)          Objective     Concurrent Complaints  Positive for headaches, nausea/motion sickness, tinnitus and visual change (fuzziness while driving, reading)  Negative for hearing loss  Neuro Exam:     Dizziness  Positive for disequilibrium, vertigo (just the one night), motion sickness, light-headedness (bend over and stand up) and rocking or swaying  Negative for oscillopsia and diplopia  Exacerbating factors  Positive for bending over, rolling in bed (sometimes makes it better, sometimes worse), walking (sometimes worse, sometimes better), turning head, optokinetic movement (scrolling on phone, in car) and walking in busy environment  Symptoms   Intensity at best: 1/10 3/30: 0/10  Intensity at worst: 9/10 3/30: 2/10  Average intensity: 3/10 3/30: 0 5/10    Headaches   Patient reports headaches: Yes     Frequency: 1 every 2 weeks  Duration: few hours    Cervical exam   Modified VBI   Left: asymptomatic  Right: asymptomatic    Oculomotor exam   Oculomotor ROM: WNL  Resting nystagmus: not present   Gaze holding nystagmus: not present left  and not present right  Smooth pursuits: within normal limits  Vertical saccades: normal  Horizontal saccades: normal and dizziness to L side  Convergence: decreased convergence on R eye, did not see double, only blurry  Convergence: abnormal  Head thrust: left abnormal and right abnormal    Positional testing   Positional testing comment: Fakuda step test + R side  Dynamic Visual Acuity: 4 lines difference, significant dizziness 3/30: 1 line difference      Balance assessments   MCTSIB   Eyes open level surface: 30  Eyes open foam surface: 30  Eyes closed level surface: 30  Eyes closed foam surface: 30        Precautions: recent ankle sprain, OA      Manuals 3/8  3/9  3/14  3/16  3/21  3/25  3/28  3/30                                                                                   Neuro Re-Ed                   FGA NV   27/30               Seated VOR x 1   Seated, plain horizontal/vertical 30" x 3 each  Standing, plain horiztonal/vertical 30" x 3  standing, plain horiztonal/vertical 30" x 3  standing, busy,horiztonal/vertical 30" x 3 each    Walking VOR x 1 hallway   seated on PB bouncing up and down 30" x 3        VOR x 2     Standing, busy, horiztonal/vertical 30" x 3 each      Seated smooth pursuits    seated, plain horizontal/vertical 30" x 3 each  smooth pursuit [place] CW/CCW standing, plain, 30" x 3 each  CW/CCW standing, plain, 30" x 3 each    CW/CCW standing, busy 30" x 2 each       Saccades     seated, plain, horizontal/vertical 30" x 3 each               Foam EC      NBOS, 30" x 3       foam NBOS, bending forward to  cones to side and passing behind to therapist 10 cones each side   Standing on black pads x 10 each side with twist behind  foam NBOS, bending forward to toes and back up with EC, x 10 reps    Eyes closed step up 6-inch     Sit to stand on foam with EC x 10    VOR Cx    seated, plain, horiztonal/ vertical 30" x 3 each  standing, plain, horizontal/vertical 30" x 3 each  standing, plan, horiztonal/vertical 30" x 3 each Standing, busy, horiztonal/vertical 30" x 3 each  standing, busy, horiztonal/vertical 30" x 3 each       amb 360 degrees     2 laps hallway Figure 8's, spirals, and 360 degree turns around cones in hallway x 2-3 each direction each Figure 8's x 5 laps, spirals x 3 each direction, 360 degree turns around cones 1 lap each direction    amb with 360 degree turns with head mvt, x 3 laps  Quick turn around 180 degrees x 2 laps    360 degree turns x 2 lap    FGA 29/30   EC HT/HN   FTEC 30" x 3 each FTEC 30" x 3 each FTEC foam, HT/HN 3 x 30" each      Foam beams   Tandem x 3 laps, sideways with HN 3 laps Tandem x 3 laps, sideways with HN x 3 laps Tandem x 3 laps, sideways with HN x 2 laps       Virtualis            car simulation easy, seated passenger seat, speed 0 75-0 9 speed Crowd VR, shopping center % crowd density x 5 min  Grocery shopping x 12 minutes  video of walking through grocery store, driving on [place] [address] x 12 minutes   Ther Ex                          TM 2 2-2 5mph with BUE's, 10 min, HT/HN 2 min each  TM  2  7mph with BUE's for 12 minutes, HT for 3 minutes  TM  2  7mph with BUE's for 14 minutes, HT/HN for 1 minute intervals  TM  2  8mph with BUE for  Following cars with eyes and head out window, 15 minutes  TM   2  8mph with BUE, following cars with eyes and head outside window, 15 minutes  TM  2  8mph with BUE, following cars with eyes and head outside window, 15 minutes                                                                                                                                               Ther Activity                                                           Gait Training                                                           Modalities Results of testing, VOR and impulse testing, DVA, COVID disease specific edu                                                           Access Code: [email or web address]  URL: [email or web address]  Date: 03/09/2022  Prepared by: Jonette Ormond    Exercises  · Seated Gaze Stabilization with Head Rotation - 2 x daily - 7 x weekly - 3 sets - 30 hold  · Seated Gaze Stabilization with Head Nod - 2 x daily - 7 x weekly - 3 sets - 30 hold  · Seated Vertical Smooth Pursuit - 2 x daily - 7 x weekly - 3 sets - 30 hold  · Seated Horizontal Smooth Pursuit - 2 x daily - 7 x weekly - 3 sets - 30 hold  · Seated VOR Cancellation - 2 x daily - 7 x weekly - 3 sets - 30 hold  · Seated Horizontal Saccades - 2 x daily - 7 x weekly - 3 sets - 30 hold  · Seated Vertical Saccades - 2 x daily - 7 x weekly - 3 sets - 30 hold

## 2022-04-04 ENCOUNTER — APPOINTMENT (OUTPATIENT)
Dept: PHYSICAL THERAPY | Facility: CLINIC | Age: 67
End: 2022-04-04
Payer: COMMERCIAL

## 2022-04-06 ENCOUNTER — OFFICE VISIT (OUTPATIENT)
Dept: PHYSICAL THERAPY | Facility: CLINIC | Age: 67
End: 2022-04-06
Payer: COMMERCIAL

## 2022-04-06 DIAGNOSIS — R42 DIZZINESS: Primary | ICD-10-CM

## 2022-04-06 PROCEDURE — 97112 NEUROMUSCULAR REEDUCATION: CPT

## 2022-04-06 NOTE — PROGRESS NOTES
Daily Note     Today's date: 2022  Patient name: Royal Hebert  : 1955  MRN: [id number]  Referring provider: Glenny Torres PA-C  Dx:   Encounter Diagnosis     ICD-10-CM    1  Dizziness  R42                   Subjective: Patient reports she drove a longer time the other day, stated that she didn't get any dizziness, but had some "humming in her ears"  She states that she also got dizzy when bending down and looking at her dogs  She states that she wishes for this to be the last session to reserve some visits for the year  Percieved improvement 85%  Objective: See treatment diary below      Assessment: Tolerated treatment well  Patient demonstrated fatigue post treatment, exhibited good technique with therapeutic exercises and would benefit from continued PT  Patient continues to have some motion sensitivity, however gets some motion sickness at baseline  Patient was issued HEP, with addition of 10 minutes of optokinetic training youtube videos, mainly of driving on winding roads and walking through grocery stores  Plan: Patient placed on 30 day hold, to return if patient wishes to make additional visits, with discharge after 30 days if patient has not returned       Precautions: recent ankle sprain, OA      Manuals 3/8  3/9  3/14  3/16  3/21  3/25  3/28  3/30 4/6                                                                                       Neuro Re-Ed                    FGA NV                   Seated VOR x 1   Seated, plain horizontal/vertical 30" x 3 each  Standing, plain horiztonal/vertical 30" x 3  standing, plain horiztonal/vertical 30" x 3  standing, busy,horiztonal/vertical 30" x 3 each    Walking VOR x 1 hallway   seated on PB bouncing up and down 30" x 3         VOR x 2     Standing, busy, horiztonal/vertical 30" x 3 each       Seated smooth pursuits    seated, plain horizontal/vertical 30" x 3 each  smooth pursuit [place] CW/CCW standing, plain, 30" x 3 each  CW/CCW standing, plain, 30" x 3 each    CW/CCW standing, busy 30" x 2 each        Saccades     seated, plain, horizontal/vertical 30" x 3 each                Foam EC      NBOS, 30" x 3       foam NBOS, bending forward to  cones to side and passing behind to therapist 10 cones each side  Standing on black pads x 10 each side with twist behind  foam NBOS, bending forward to toes and back up with EC, x 10 reps    Eyes closed step up 6-inch     Sit to stand on foam with EC x 10     VOR Cx    seated, plain, horiztonal/ vertical 30" x 3 each  standing, plain, horizontal/vertical 30" x 3 each  standing, plan, horiztonal/vertical 30" x 3 each Standing, busy, horiztonal/vertical 30" x 3 each  standing, busy, horiztonal/vertical 30" x 3 each        amb 360 degrees     2 laps hallway Figure 8's, spirals, and 360 degree turns around cones in hallway x 2-3 each direction each Figure 8's x 5 laps, spirals x 3 each direction, 360 degree turns around cones 1 lap each direction    amb with 360 degree turns with head mvt, x 3 laps  Quick turn around 180 degrees x 2 laps    360 degree turns x 2 lap    FGA 29/30 Standing EC turning 360 degrees each direction, x 5 each    Sit to stand on foam with EC x 8 reps and adding head turns x 10 reps    Standing lumbar flexion with cervical flexion, then extension looking up x 10 reps    EC HT/HN   FTEC 30" x 3 each FTEC 30" x 3 each FTEC foam, HT/HN 3 x 30" each       Foam beams   Tandem x 3 laps, sideways with HN 3 laps Tandem x 3 laps, sideways with HN x 3 laps Tandem x 3 laps, sideways with HN x 2 laps        Virtualis            car simulation easy, seated passenger seat, speed 0 75-0 9 speed Crowd VR, shopping center % crowd density x 5 min  Grocery shopping x 12 minutes  video of walking through grocery store, driving on winding road x 12 minutes Video of driving on winding road   Ther Ex                           TM 2 2-2 5mph with BUE's, 10 min, HT/HN 2 min each  TM  2  7mph with BUE's for 12 minutes, HT for 3 minutes  TM  2  7mph with BUE's for 14 minutes, HT/HN for 1 minute intervals  TM  2  8mph with BUE for  Following cars with eyes and head out window, 15 minutes  TM   2  8mph with BUE, following cars with eyes and head outside window, 15 minutes  TM  2  8mph with BUE, following cars with eyes and head outside window, 15 minutes TM  2  8mph with BUE, following cars with eyes and head outside window, 12 minutes                                                                                                                                                      Ther Activity                                                              Gait Training                                                              Modalities Results of testing, VOR and impulse testing, DVA, COVID disease specific edu               HEP printout and youtube videos with optokinetic training, progression of exercises, POC, disease specific edu                                               Access Code: [email or web address]  URL: [email or web address]  Date: 04/06/2022  Prepared by: Luis Brush    Exercises  · Seated Gaze Stabilization with Head Rotation - 2 x daily - 7 x weekly - 3 sets - 30 hold  · Seated Gaze Stabilization with Head Nod - 2 x daily - 7 x weekly - 3 sets - 30 hold  · Seated Vertical Smooth Pursuit - 2 x daily - 7 x weekly - 3 sets - 30 hold  · Seated Horizontal Smooth Pursuit - 2 x daily - 7 x weekly - 3 sets - 30 hold  · Seated VOR Cancellation - 2 x daily - 7 x weekly - 3 sets - 30 hold  · Seated Horizontal Saccades - 2 x daily - 7 x weekly - 3 sets - 30 hold  · Seated Vertical Saccades - 2 x daily - 7 x weekly - 3 sets - 30 hold  · Walking with Head Rotation - 1 x daily - 7 x weekly - 5 sets  · Forward Walking with Diagonal Head Turns - 1 x daily - 7 x weekly - 5 sets  · Tandem Walking with Diagonal Head Turns - 1 x daily - 7 x weekly - 5 sets  · Walking with Head Nod - 1 x daily - 7 x weekly - 5 sets  · 360 Degree Turn with Eyes Closed - 1 x daily - 5 x weekly - 1 sets - 10 reps  · 360 Degree Turn in Both Directions - 1 x daily - 5 x weekly - 1 sets - 10 reps  · Walking Gaze Stabilization Head Rotation - 1 x daily - 7 x weekly - 5 sets  · Sit to Stand on Foam Pad - 1 x daily - 5 x weekly - 3 sets - 10 reps  · Standing Forward Trunk Flexion - 1 x daily - 5 x weekly - 2 sets - 10 reps

## 2022-04-11 ENCOUNTER — APPOINTMENT (OUTPATIENT)
Dept: PHYSICAL THERAPY | Facility: CLINIC | Age: 67
End: 2022-04-11
Payer: COMMERCIAL

## 2022-04-13 ENCOUNTER — HOSPITAL ENCOUNTER (OUTPATIENT)
Dept: MRI IMAGING | Facility: HOSPITAL | Age: 67
Discharge: HOME | End: 2022-04-13
Payer: COMMERCIAL

## 2022-04-13 DIAGNOSIS — H93.13 TINNITUS, BILATERAL: ICD-10-CM

## 2022-04-13 DIAGNOSIS — H91.21 SUDDEN HEARING LOSS, RIGHT: ICD-10-CM

## 2022-04-13 DIAGNOSIS — H53.8 BLURRED VISION, BILATERAL: ICD-10-CM

## 2022-04-13 DIAGNOSIS — R42 DIZZINESS: ICD-10-CM

## 2022-04-13 PROCEDURE — A9585 GADOBUTROL INJECTION: HCPCS | Performed by: PHYSICIAN ASSISTANT

## 2022-04-13 PROCEDURE — 70553 MRI BRAIN STEM W/O & W/DYE: CPT

## 2022-04-13 PROCEDURE — G1004 CDSM NDSC: HCPCS

## 2022-04-13 RX ADMIN — GADOBUTROL 6 ML: 604.72 INJECTION INTRAVENOUS at 11:23

## 2022-04-14 ENCOUNTER — APPOINTMENT (OUTPATIENT)
Dept: PHYSICAL THERAPY | Facility: CLINIC | Age: 67
End: 2022-04-14
Payer: COMMERCIAL

## 2022-05-26 ENCOUNTER — HOSPITAL ENCOUNTER (OUTPATIENT)
Dept: RADIOLOGY | Facility: HOSPITAL | Age: 67
Discharge: HOME | End: 2022-05-26
Payer: COMMERCIAL

## 2022-05-26 DIAGNOSIS — R93.0 ABNORMAL MRI OF HEAD: ICD-10-CM

## 2022-05-26 DIAGNOSIS — R42 DIZZINESS: ICD-10-CM

## 2022-05-26 DIAGNOSIS — H53.8 BLURRED VISION, BILATERAL: ICD-10-CM

## 2022-05-26 PROCEDURE — 70553 MRI BRAIN STEM W/O & W/DYE: CPT

## 2022-05-26 PROCEDURE — A9585 GADOBUTROL INJECTION: HCPCS | Performed by: PHYSICIAN ASSISTANT

## 2022-05-26 PROCEDURE — G1004 CDSM NDSC: HCPCS

## 2022-05-26 RX ADMIN — GADOBUTROL 6 ML: 604.72 INJECTION INTRAVENOUS at 14:29

## 2022-06-10 ENCOUNTER — HOSPITAL ENCOUNTER (OUTPATIENT)
Dept: CT IMAGING | Facility: HOSPITAL | Age: 67
Discharge: HOME | End: 2022-06-10
Payer: COMMERCIAL

## 2022-06-10 DIAGNOSIS — R93.0 ABNORMAL MRI OF HEAD: ICD-10-CM

## 2022-06-10 DIAGNOSIS — J34.89 SILENT SINUS SYNDROME: ICD-10-CM

## 2022-06-10 DIAGNOSIS — H53.8 BLURRED VISION, BILATERAL: ICD-10-CM

## 2022-06-10 PROCEDURE — 70486 CT MAXILLOFACIAL W/O DYE: CPT

## 2022-06-10 PROCEDURE — G1004 CDSM NDSC: HCPCS

## 2022-07-20 ENCOUNTER — APPOINTMENT (OUTPATIENT)
Dept: LAB | Facility: CLINIC | Age: 67
End: 2022-07-20
Payer: COMMERCIAL

## 2022-07-20 ENCOUNTER — OFFICE VISIT (OUTPATIENT)
Dept: LAB | Facility: CLINIC | Age: 67
End: 2022-07-20
Payer: COMMERCIAL

## 2022-07-20 DIAGNOSIS — J32.0 CHRONIC MAXILLARY SINUSITIS: ICD-10-CM

## 2022-07-20 LAB
ANION GAP SERPL CALCULATED.3IONS-SCNC: 6 MMOL/L (ref 4–13)
ATRIAL RATE: 79 BPM
BASOPHILS # BLD AUTO: 0.02 THOUSANDS/ΜL (ref 0–0.1)
BASOPHILS NFR BLD AUTO: 1 % (ref 0–1)
BUN SERPL-MCNC: 12 MG/DL (ref 5–25)
CALCIUM SERPL-MCNC: 9.4 MG/DL (ref 8.4–10.2)
CHLORIDE SERPL-SCNC: 103 MMOL/L (ref 96–108)
CO2 SERPL-SCNC: 28 MMOL/L (ref 21–32)
CREAT SERPL-MCNC: 0.69 MG/DL (ref 0.6–1.3)
EOSINOPHIL # BLD AUTO: 0.07 THOUSAND/ΜL (ref 0–0.61)
EOSINOPHIL NFR BLD AUTO: 2 % (ref 0–6)
ERYTHROCYTE [DISTWIDTH] IN BLOOD BY AUTOMATED COUNT: 12.5 % (ref 11.6–15.1)
GFR SERPL CREATININE-BSD FRML MDRD: 92 ML/MIN/1.73SQ M
GLUCOSE SERPL-MCNC: 99 MG/DL (ref 65–140)
HCT VFR BLD AUTO: 40.6 % (ref 34.8–46.1)
HGB BLD-MCNC: 13.4 G/DL (ref 11.5–15.4)
IMM GRANULOCYTES # BLD AUTO: 0.01 THOUSAND/UL (ref 0–0.2)
IMM GRANULOCYTES NFR BLD AUTO: 0 % (ref 0–2)
LYMPHOCYTES # BLD AUTO: 1.54 THOUSANDS/ΜL (ref 0.6–4.47)
LYMPHOCYTES NFR BLD AUTO: 35 % (ref 14–44)
MCH RBC QN AUTO: 31.9 PG (ref 26.8–34.3)
MCHC RBC AUTO-ENTMCNC: 33 G/DL (ref 31.4–37.4)
MCV RBC AUTO: 97 FL (ref 82–98)
MONOCYTES # BLD AUTO: 0.43 THOUSAND/ΜL (ref 0.17–1.22)
MONOCYTES NFR BLD AUTO: 10 % (ref 4–12)
NEUTROPHILS # BLD AUTO: 2.35 THOUSANDS/ΜL (ref 1.85–7.62)
NEUTS SEG NFR BLD AUTO: 52 % (ref 43–75)
NRBC BLD AUTO-RTO: 0 /100 WBCS
P AXIS: 55 DEGREES
PLATELET # BLD AUTO: 242 THOUSANDS/UL (ref 149–390)
PMV BLD AUTO: 9.9 FL (ref 8.9–12.7)
POTASSIUM SERPL-SCNC: 3.8 MMOL/L (ref 3.5–5.3)
PR INTERVAL: 170 MS
QRS AXIS: 50 DEGREES
QRSD INTERVAL: 80 MS
QT INTERVAL: 394 MS
QTC INTERVAL: 451 MS
RBC # BLD AUTO: 4.2 MILLION/UL (ref 3.81–5.12)
SODIUM SERPL-SCNC: 137 MMOL/L (ref 135–147)
T WAVE AXIS: 37 DEGREES
VENTRICULAR RATE: 79 BPM
WBC # BLD AUTO: 4.42 THOUSAND/UL (ref 4.31–10.16)

## 2022-07-20 PROCEDURE — 93010 ELECTROCARDIOGRAM REPORT: CPT | Performed by: INTERNAL MEDICINE

## 2022-07-20 PROCEDURE — 80048 BASIC METABOLIC PNL TOTAL CA: CPT

## 2022-07-20 PROCEDURE — 93005 ELECTROCARDIOGRAM TRACING: CPT

## 2022-07-20 PROCEDURE — 85025 COMPLETE CBC W/AUTO DIFF WBC: CPT

## 2022-07-20 PROCEDURE — 36415 COLL VENOUS BLD VENIPUNCTURE: CPT

## 2022-09-09 ENCOUNTER — APPOINTMENT (OUTPATIENT)
Dept: LAB | Facility: CLINIC | Age: 67
End: 2022-09-09
Payer: MEDICARE

## 2022-09-09 DIAGNOSIS — E23.7 DISEASE OF PITUITARY GLAND (HCC): ICD-10-CM

## 2022-09-09 LAB
FSH SERPL-ACNC: 88.7 MIU/ML
LH SERPL-ACNC: 34.7 MIU/ML
PROLACTIN SERPL-MCNC: 12.4 NG/ML
T4 FREE SERPL-MCNC: 1.02 NG/DL (ref 0.76–1.46)
TSH SERPL DL<=0.05 MIU/L-ACNC: 3.32 UIU/ML (ref 0.45–4.5)

## 2022-09-09 PROCEDURE — 83001 ASSAY OF GONADOTROPIN (FSH): CPT

## 2022-09-09 PROCEDURE — 84305 ASSAY OF SOMATOMEDIN: CPT

## 2022-09-09 PROCEDURE — 84146 ASSAY OF PROLACTIN: CPT

## 2022-09-09 PROCEDURE — 82024 ASSAY OF ACTH: CPT

## 2022-09-09 PROCEDURE — 83002 ASSAY OF GONADOTROPIN (LH): CPT

## 2022-09-09 PROCEDURE — 36415 COLL VENOUS BLD VENIPUNCTURE: CPT

## 2022-09-09 PROCEDURE — 84439 ASSAY OF FREE THYROXINE: CPT

## 2022-09-09 PROCEDURE — 84443 ASSAY THYROID STIM HORMONE: CPT

## 2022-09-10 LAB — ACTH PLAS-MCNC: 20.9 PG/ML (ref 7.2–63.3)

## 2022-09-11 LAB — IGF-I SERPL-MCNC: 83 NG/ML (ref 57–202)

## 2022-09-12 ENCOUNTER — APPOINTMENT (OUTPATIENT)
Dept: LAB | Facility: CLINIC | Age: 67
End: 2022-09-12
Payer: MEDICARE

## 2022-09-12 DIAGNOSIS — E23.7 DISEASE OF PITUITARY GLAND (HCC): ICD-10-CM

## 2022-09-12 PROCEDURE — 82530 CORTISOL FREE: CPT

## 2022-09-19 LAB
CORTIS F 24H UR-MRATE: 46 UG/24 HR (ref 6–42)
CORTIS F UR-MCNC: 11 UG/L

## 2022-12-02 ENCOUNTER — HOSPITAL ENCOUNTER (OUTPATIENT)
Dept: RADIOLOGY | Facility: IMAGING CENTER | Age: 67
End: 2022-12-02

## 2022-12-02 DIAGNOSIS — N95.9 MENOPAUSAL DISORDER: ICD-10-CM

## 2022-12-03 ENCOUNTER — APPOINTMENT (OUTPATIENT)
Dept: LAB | Facility: CLINIC | Age: 67
End: 2022-12-03

## 2022-12-03 DIAGNOSIS — R73.01 IMPAIRED FASTING GLUCOSE: ICD-10-CM

## 2022-12-03 LAB
ANION GAP SERPL CALCULATED.3IONS-SCNC: 4 MMOL/L (ref 4–13)
BUN SERPL-MCNC: 11 MG/DL (ref 5–25)
CALCIUM SERPL-MCNC: 8.9 MG/DL (ref 8.4–10.2)
CHLORIDE SERPL-SCNC: 104 MMOL/L (ref 96–108)
CHOLEST SERPL-MCNC: 168 MG/DL
CO2 SERPL-SCNC: 27 MMOL/L (ref 21–32)
CREAT SERPL-MCNC: 0.64 MG/DL (ref 0.6–1.3)
EST. AVERAGE GLUCOSE BLD GHB EST-MCNC: 111 MG/DL
GFR SERPL CREATININE-BSD FRML MDRD: 93 ML/MIN/1.73SQ M
GLUCOSE P FAST SERPL-MCNC: 101 MG/DL (ref 65–99)
HBA1C MFR BLD: 5.5 %
HDLC SERPL-MCNC: 70 MG/DL
LDLC SERPL CALC-MCNC: 84 MG/DL (ref 0–100)
NONHDLC SERPL-MCNC: 98 MG/DL
POTASSIUM SERPL-SCNC: 4.1 MMOL/L (ref 3.5–5.3)
SODIUM SERPL-SCNC: 135 MMOL/L (ref 135–147)
TRIGL SERPL-MCNC: 70 MG/DL

## 2022-12-07 ENCOUNTER — TELEPHONE (OUTPATIENT)
Dept: GASTROENTEROLOGY | Facility: CLINIC | Age: 67
End: 2022-12-07

## 2022-12-07 ENCOUNTER — PREP FOR PROCEDURE (OUTPATIENT)
Dept: GASTROENTEROLOGY | Facility: CLINIC | Age: 67
End: 2022-12-07

## 2022-12-07 DIAGNOSIS — Z86.010 HISTORY OF COLON POLYPS: Primary | ICD-10-CM

## 2022-12-07 NOTE — TELEPHONE ENCOUNTER
12/07/22  Screened by: Florencio Brown    Referring Provider Vinay Henao    Pre- Screening: There is no height or weight on file to calculate BMI  Has patient been referred for a routine screening Colonoscopy? yes  Is the patient between 39-70 years old? yes      Previous Colonoscopy yes   If yes:    Date: 5 yrs ago    Facility:     Reason:       SCHEDULING STAFF: If the patient is between 45yrs-49yrs, please advise patient to confirm benefits/coverage with their insurance company for a routine screening colonoscopy, some insurance carriers will only cover at [phone] or older  If the patient is over 66years old, please schedule an office visit  Does the patient want to see a Gastroenterologist prior to their procedure OR are they having any GI symptoms? no    Has the patient been hospitalized or had abdominal surgery in the past 6 months? no    Does the patient use supplemental oxygen? no    Does the patient take Coumadin, Lovenox, Plavix, Elliquis, Xarelto, or other blood thinning medication? no    Has the patient had a stroke, cardiac event, or stent placed in the past year? no     PT PASSED OA    SCHEDULING STAFF: If patient answers NO to above questions, then schedule procedure  If patient answers YES to above questions, then schedule office appointment  If patient is between 45yrs - 49yrs, please advise patient that we will have to confirm benefits & coverage with their insurance company for a routine screening colonoscopy

## 2023-02-17 ENCOUNTER — HOSPITAL ENCOUNTER (OUTPATIENT)
Dept: MAMMOGRAPHY | Facility: HOSPITAL | Age: 68
Discharge: HOME | End: 2023-02-17

## 2023-02-17 VITALS — HEIGHT: 64 IN | WEIGHT: 139.99 LBS | BODY MASS INDEX: 23.9 KG/M2

## 2023-02-17 DIAGNOSIS — Z12.31 SCREENING MAMMOGRAM FOR HIGH-RISK PATIENT: ICD-10-CM

## 2023-02-27 ENCOUNTER — TELEPHONE (OUTPATIENT)
Dept: GASTROENTEROLOGY | Facility: CLINIC | Age: 68
End: 2023-02-27

## 2023-03-07 ENCOUNTER — NURSE TRIAGE (OUTPATIENT)
Dept: OTHER | Facility: OTHER | Age: 68
End: 2023-03-07

## 2023-03-07 NOTE — TELEPHONE ENCOUNTER
Regarding: Prep and instructions  ----- Message from Soha Pinon sent at 3/7/2023  4:05 PM EST -----  "I never received my prep instruction or prescription "

## 2023-03-07 NOTE — TELEPHONE ENCOUNTER
Reason for Disposition  • [1] Caller has NON-URGENT medicine question about med that PCP prescribed AND [2] triager unable to answer question    Answer Assessment - Initial Assessment Questions  1  NAME of MEDICATION: "What medicine are you calling about?"      unknown  2  QUESTION: "What is your question?" (e g , medication refill, side effect)      I have not received any prep to my pharmacy  3  PRESCRIBING HCP: "Who prescribed it?" Reason: if prescribed by specialist, call should be referred to that group  [facility]astro Dr Cortney Berkowitz  4  SYMPTOMS: "Do you have any symptoms?"        5  SEVERITY: If symptoms are present, ask "Are they mild, moderate or severe?"     colonoscopy is next week  Procedure is on 3/14/2023 and does not have prep ordered or instructions      Protocols used: MEDICATION QUESTION CALL-ADULT-

## 2023-03-08 NOTE — TELEPHONE ENCOUNTER
Spoke with patient  Patient will purchase Miralax/Dulcolax prep OTC  Emailed Miralax/Dulcolax prep instructions to patient's e-mail on file

## 2023-03-13 ENCOUNTER — ANESTHESIA EVENT (OUTPATIENT)
Dept: ANESTHESIOLOGY | Facility: HOSPITAL | Age: 68
End: 2023-03-13

## 2023-03-13 ENCOUNTER — ANESTHESIA (OUTPATIENT)
Dept: ANESTHESIOLOGY | Facility: HOSPITAL | Age: 68
End: 2023-03-13

## 2023-03-13 RX ORDER — SODIUM CHLORIDE, SODIUM LACTATE, POTASSIUM CHLORIDE, CALCIUM CHLORIDE 600; 310; 30; 20 MG/100ML; MG/100ML; MG/100ML; MG/100ML
125 INJECTION, SOLUTION INTRAVENOUS CONTINUOUS
Status: CANCELLED | OUTPATIENT
Start: 2023-03-13

## 2023-03-13 RX ORDER — LIDOCAINE HYDROCHLORIDE 10 MG/ML
0.5 INJECTION, SOLUTION EPIDURAL; INFILTRATION; INTRACAUDAL; PERINEURAL ONCE AS NEEDED
Status: CANCELLED | OUTPATIENT
Start: 2023-03-13

## 2023-03-14 ENCOUNTER — ANESTHESIA (OUTPATIENT)
Dept: GASTROENTEROLOGY | Facility: AMBULARY SURGERY CENTER | Age: 68
End: 2023-03-14

## 2023-03-14 ENCOUNTER — HOSPITAL ENCOUNTER (OUTPATIENT)
Dept: GASTROENTEROLOGY | Facility: AMBULARY SURGERY CENTER | Age: 68
Setting detail: OUTPATIENT SURGERY
Discharge: HOME | End: 2023-03-14
Attending: INTERNAL MEDICINE

## 2023-03-14 ENCOUNTER — ANESTHESIA EVENT (OUTPATIENT)
Dept: GASTROENTEROLOGY | Facility: AMBULARY SURGERY CENTER | Age: 68
End: 2023-03-14

## 2023-03-14 VITALS
SYSTOLIC BLOOD PRESSURE: 111 MMHG | BODY MASS INDEX: 23.9 KG/M2 | RESPIRATION RATE: 18 BRPM | HEIGHT: 64 IN | HEART RATE: 57 BPM | WEIGHT: 140 LBS | DIASTOLIC BLOOD PRESSURE: 61 MMHG | TEMPERATURE: 97.9 F | OXYGEN SATURATION: 100 %

## 2023-03-14 DIAGNOSIS — Z86.010 HISTORY OF COLON POLYPS: ICD-10-CM

## 2023-03-14 RX ORDER — PROPOFOL 10 MG/ML
INJECTION, EMULSION INTRAVENOUS AS NEEDED
Status: DISCONTINUED | OUTPATIENT
Start: 2023-03-14 | End: 2023-03-14

## 2023-03-14 RX ORDER — PROPOFOL 10 MG/ML
INJECTION, EMULSION INTRAVENOUS CONTINUOUS PRN
Status: DISCONTINUED | OUTPATIENT
Start: 2023-03-14 | End: 2023-03-14

## 2023-03-14 RX ORDER — SODIUM CHLORIDE, SODIUM LACTATE, POTASSIUM CHLORIDE, CALCIUM CHLORIDE 600; 310; 30; 20 MG/100ML; MG/100ML; MG/100ML; MG/100ML
INJECTION, SOLUTION INTRAVENOUS CONTINUOUS PRN
Status: DISCONTINUED | OUTPATIENT
Start: 2023-03-14 | End: 2023-03-14

## 2023-03-14 RX ORDER — LIDOCAINE HYDROCHLORIDE 10 MG/ML
INJECTION, SOLUTION EPIDURAL; INFILTRATION; INTRACAUDAL; PERINEURAL AS NEEDED
Status: DISCONTINUED | OUTPATIENT
Start: 2023-03-14 | End: 2023-03-14

## 2023-03-14 RX ADMIN — LIDOCAINE HYDROCHLORIDE 50 MG: 10 INJECTION, SOLUTION EPIDURAL; INFILTRATION; INTRACAUDAL at 11:30

## 2023-03-14 RX ADMIN — PROPOFOL 130 MCG/KG/MIN: 10 INJECTION, EMULSION INTRAVENOUS at 11:30

## 2023-03-14 RX ADMIN — SODIUM CHLORIDE, SODIUM LACTATE, POTASSIUM CHLORIDE, AND CALCIUM CHLORIDE: .6; .31; .03; .02 INJECTION, SOLUTION INTRAVENOUS at 11:22

## 2023-03-14 RX ADMIN — PROPOFOL 100 MG: 10 INJECTION, EMULSION INTRAVENOUS at 11:30

## 2023-03-14 NOTE — ANESTHESIA PREPROCEDURE EVALUATION
Procedure:  PRE-OP ONLY    Relevant Problems   MUSCULOSKELETAL   (+) Arthritis   (+) Primary osteoarthritis of left knee             Anesthesia Plan  ASA Score- 2     Anesthesia Type- IV sedation with anesthesia with ASA Monitors  Additional Monitors:   Airway Plan:           Plan Factors-    Chart reviewed  Patient is not a current smoker  Induction- intravenous  Postoperative Plan-     Informed Consent- Anesthetic plan and risks discussed with patient  I personally reviewed this patient with the CRNA  Discussed and agreed on the Anesthesia Plan with the CRNA  Tasha Gusman

## 2023-03-14 NOTE — ANESTHESIA POSTPROCEDURE EVALUATION
Post-Op Assessment Note    CV Status:  Stable  Pain Score: 0    Pain management: adequate     Mental Status:  Arousable and sleepy   Hydration Status:  Stable and euvolemic   PONV Controlled:  Controlled   Airway Patency:  Patent and adequate      Post Op Vitals Reviewed: Yes      Staff: CRNA         No notable events documented      BP   95/52   Temp     Pulse  60   Resp   20   SpO2   98

## 2023-03-14 NOTE — ANESTHESIA PREPROCEDURE EVALUATION
Procedure:  COLONOSCOPY    Relevant Problems   MUSCULOSKELETAL   (+) Arthritis   (+) Primary osteoarthritis of left knee        Physical Exam    Airway    Mallampati score: II  TM Distance: >3 FB  Neck ROM: full     Dental   No notable dental hx     Cardiovascular      Pulmonary      Other Findings        Anesthesia Plan  ASA Score- 1     Anesthesia Type- IV sedation with anesthesia with ASA Monitors  Additional Monitors:   Airway Plan:           Plan Factors-Exercise tolerance (METS): >4 METS  Chart reviewed  Patient is not a current smoker  Induction- intravenous  Postoperative Plan-     Informed Consent- Anesthetic plan and risks discussed with patient  I personally reviewed this patient with the CRNA  Discussed and agreed on the Anesthesia Plan with the CRNA  Marie Gill

## 2023-03-14 NOTE — H&P
History and Physical - [facility]  Em Kaur 74 y o  female MRN: [id number]        HPI: Em Kaur is a 74y o  year old female who presents for tubular adenoma removed in 2017  REVIEW OF SYSTEMS: Per the HPI, and otherwise unremarkable  Historical Information   Past Medical History:   Diagnosis Date   • Arthritis    • Colon polyp    • Encounter for screening colonoscopy    • Environmental allergies    • Fibroid    • Seasonal allergies      Past Surgical History:   Procedure Laterality Date   • APPENDECTOMY     • COLONOSCOPY     • HYSTERECTOMY      age 39   • KNEE ARTHROSCOPY Right    • NC COLONOSCOPY FLX DX W/COLLJ SPEC WHEN PFRMD N/A 10/17/2017    Procedure: COLONOSCOPY;  Surgeon: Renay Angeles DO;  Location: [facility];   Service: Gastroenterology   • THUMB ARTHROSCOPY     • TONSILLECTOMY     • WISDOM TOOTH EXTRACTION       Social History   Social History     Substance and Sexual Activity   Alcohol Use No     Social History     Substance and Sexual Activity   Drug Use No     Social History     Tobacco Use   Smoking Status Never   Smokeless Tobacco Never     Family History   Problem Relation Age of Onset   • Stroke Mother    • Hypertension Father    • Heart attack Father    • Melanoma Father    • Prostate cancer Father 72   • Melanoma Sister 62   • No Known Problems Sister    • No Known Problems Daughter    • No Known Problems Daughter    • No Known Problems Son    • Prostate cancer Paternal Uncle         age unknown   • No Known Problems Paternal Uncle    • Lung cancer Maternal Aunt 71   • No Known Problems Maternal Aunt        Meds/Allergies       Current Outpatient Medications:   •  Cholecalciferol 25 MCG (1000 UT) capsule  •  Elderberry 500 MG CAPS  •  fluticasone (FLONASE) 50 mcg/act nasal spray  •  levocetirizine (XYZAL) 5 MG tablet  •  levothyroxine 50 mcg tablet  •  Multiple Vitamin (CALCIUM COMPLEX PO)  •  ALPRAZolam (XANAX) 0 25 mg tablet  •  cholecalciferol (VITAMIN D3) 1,000 units tablet  •  fluticasone (FLONASE) 50 mcg/act nasal spray  •  loratadine (CLARITIN) 10 mg tablet  •  predniSONE 20 mg tablet    Allergies   Allergen Reactions   • Percocet [Oxycodone-Acetaminophen] GI Intolerance   • Septra [Sulfamethoxazole-Trimethoprim] GI Intolerance       Objective     /60   Pulse 70   Temp (!) 96 8 °F (36 °C) (Temporal)   Resp 18   Ht 5' 4" (1 626 m)   Wt 63 5 kg (140 lb)   SpO2 98%   BMI 24 03 kg/m²       PHYSICAL EXAM    Gen: NAD  Head: NCAT  CV: RRR  CHEST: Clear  ABD: soft, NT/ND  EXT: no edema      ASSESSMENT/PLAN:  This is a 74y o  year old female here for colonoscopy, and she is stable and optimized for her procedure

## 2024-03-01 ENCOUNTER — HOSPITAL ENCOUNTER (OUTPATIENT)
Dept: RADIOLOGY | Facility: IMAGING CENTER | Age: 69
End: 2024-03-01
Payer: MEDICARE

## 2024-03-01 VITALS — BODY MASS INDEX: 26.8 KG/M2 | WEIGHT: 157 LBS | HEIGHT: 64 IN

## 2024-03-01 DIAGNOSIS — Z12.31 ENCOUNTER FOR SCREENING MAMMOGRAM FOR MALIGNANT NEOPLASM OF BREAST: ICD-10-CM

## 2024-03-01 PROCEDURE — 77063 BREAST TOMOSYNTHESIS BI: CPT

## 2024-03-01 PROCEDURE — 77067 SCR MAMMO BI INCL CAD: CPT

## 2025-05-19 ENCOUNTER — HOSPITAL ENCOUNTER (EMERGENCY)
Facility: HOSPITAL | Age: 70
Discharge: HOME | End: 2025-05-19
Attending: EMERGENCY MEDICINE
Payer: MEDICARE

## 2025-05-19 VITALS
OXYGEN SATURATION: 96 % | HEART RATE: 64 BPM | RESPIRATION RATE: 18 BRPM | DIASTOLIC BLOOD PRESSURE: 82 MMHG | TEMPERATURE: 98.3 F | SYSTOLIC BLOOD PRESSURE: 168 MMHG

## 2025-05-19 DIAGNOSIS — H11.32 SUBCONJUNCTIVAL HEMATOMA, LEFT: ICD-10-CM

## 2025-05-19 DIAGNOSIS — S05.12XA TRAUMATIC HYPHEMA OF LEFT EYE, INITIAL ENCOUNTER: ICD-10-CM

## 2025-05-19 DIAGNOSIS — S05.02XA CORNEAL ABRASION, LEFT, INITIAL ENCOUNTER: Primary | ICD-10-CM

## 2025-05-19 PROCEDURE — 99284 EMERGENCY DEPT VISIT MOD MDM: CPT | Performed by: EMERGENCY MEDICINE

## 2025-05-19 PROCEDURE — 99283 EMERGENCY DEPT VISIT LOW MDM: CPT

## 2025-05-19 RX ORDER — TETRACAINE HYDROCHLORIDE 5 MG/ML
2 SOLUTION OPHTHALMIC ONCE
Status: COMPLETED | OUTPATIENT
Start: 2025-05-19 | End: 2025-05-19

## 2025-05-19 RX ADMIN — FLUORESCEIN SODIUM 1 STRIP: 1 STRIP OPHTHALMIC at 21:24

## 2025-05-19 RX ADMIN — BACITRACIN ZINC AND POLYMYXIN B SULFATE: 500; 10000 OINTMENT OPHTHALMIC at 22:22

## 2025-05-19 RX ADMIN — TETRACAINE HYDROCHLORIDE 2 DROP: 5 SOLUTION OPHTHALMIC at 21:24

## 2025-05-20 ENCOUNTER — OFFICE VISIT (OUTPATIENT)
Age: 70
End: 2025-05-20
Attending: EMERGENCY MEDICINE
Payer: MEDICARE

## 2025-05-20 DIAGNOSIS — H43.813 POSTERIOR VITREOUS DETACHMENT OF BOTH EYES: ICD-10-CM

## 2025-05-20 DIAGNOSIS — H25.13 NUCLEAR SCLEROSIS OF BOTH EYES: ICD-10-CM

## 2025-05-20 DIAGNOSIS — H21.02 HYPHEMA OF LEFT EYE: Primary | ICD-10-CM

## 2025-05-20 PROCEDURE — 99204 OFFICE O/P NEW MOD 45 MIN: CPT | Performed by: OPHTHALMOLOGY

## 2025-05-20 RX ORDER — PREDNISOLONE ACETATE 10 MG/ML
1 SUSPENSION/ DROPS OPHTHALMIC 4 TIMES DAILY
Qty: 5 ML | Refills: 0 | Status: SHIPPED | OUTPATIENT
Start: 2025-05-20

## 2025-05-20 RX ORDER — ATROPINE SULFATE 10 MG/ML
1 SOLUTION/ DROPS OPHTHALMIC 2 TIMES DAILY
Qty: 5 ML | Refills: 0 | Status: SHIPPED | OUTPATIENT
Start: 2025-05-20

## 2025-05-20 NOTE — PROGRESS NOTES
Name: Ritika Hernandez      : 1955      MRN: [id number]  Encounter Provider: Chioma Judge MD  Encounter Date: 2025   Encounter department: [facility] OPHTHALMOLOGY  :  Assessment & Plan  Hyphema of left eye  Recommend cyclogyl BID and PF QID x 1 week; F/u in 1 week;   Orders:    prednisoLONE acetate (PRED FORTE) 1 % ophthalmic suspension; Administer 1 drop to both eyes 4 (four) times a day    atropine (ISOPTO ATROPINE) 1 % ophthalmic solution; Administer 1 drop into the left eye 2 (two) times a day    Nuclear sclerosis of both eyes  Not visually significant at this time. Continue to monitor; Pt denies any difficulty with glare or decrease in vision.         Posterior vitreous detachment of both eyes  Posterior vitreous detachment with villanueva ring both eyes: asymptomatic. Negative nazario's sign. No retinal tears, breaks, or detachments on dilated examination with scleral depression 360 degrees.  Retinal detachment precautions were discussed with the patient. The patient was instructed to call or return immediately for an increase in flashes of light, floaters (dark spots in vision), or curtaining of the visual field.            Ritika Hernandez is a 69 y.o. female who presents for evaluation of aching, blurry VA OS after being hit in eye last night with a tennis ball.    She was wearing glasses, but reports lens popped out and glasses fell off due to how hard she was hit.  Vision seems a little blurry OS.  No severe pain, but reports eye is achy.  Evaluated last night at ED and was told to f/u here today.  Prescribed abx drops but has not used yet.    History obtained from: patient    Review of Systems  Medical History Reviewed by provider this encounter:  Tobacco  Allergies  Meds  Problems  Med Hx  Surg Hx  Fam Hx     .     Past Ocular History: wears glasses  Ocular Meds/Drops: none    Base Eye Exam       Visual Acuity (Snellen - Linear)         Right Left    Dist cc 20/20 20/20-       Correction: Glasses              Tonometry (Applanation, 9:04 AM)         Right Left    Pressure 11 13              Pupils         Pupils    Right PERRL    Left PERRL              Visual Fields         Left Right     Full Full              Extraocular Movement         Right Left     Full Full              Neuro/Psych       Oriented x3: Yes    Mood/Affect: Normal              Dilation       Left eye: 2.5% Phenylephrine, 1% Tropicamide @ 9:04 AM              Dilation #2       Right eye: 2.5% Phenylephrine, 1% Tropicamide @ 9:13 AM                  Slit Lamp and Fundus Exam       External Exam         Right Left    External Normal Normal              Slit Lamp Exam         Right Left    Lids/Lashes Normal Normal    Conjunctiva/Sclera White and quiet subconj heme nasally    Cornea Clear Clear    Anterior Chamber Deep and quiet 2+ cell ( micro hyphema)    Iris Round and reactive Round and reactive    Lens trace NSC trace NSC    Anterior Vitreous PVD PVD              Fundus Exam         Right Left    Disc sharp, no pallor sharp, no pallor    C/D Ratio 0.45 0.5    Macula flat/no heme / good foveal reflex flat/no heme / good foveal reflex    Vessels normal in caliber normal in caliber    Periphery flat, no retinal tear or detachment flat, no retinal tear or detachment                      IMAGING:

## 2025-05-20 NOTE — ED PROVIDER NOTES
Time reflects when diagnosis was documented in both MDM as applicable and the Disposition within this note       Time User Action Codes Description Comment    5/19/2025  9:39 PM Flako Armenta Add [S05.02XA] Corneal abrasion, left, initial encounter     5/19/2025  9:39 PM Flako Armenta Add [S05.12XA] Traumatic hyphema of left eye, initial encounter     5/19/2025 10:10 PM Flako Armenta Add [H11.32] Subconjunctival hematoma, left           ED Disposition       ED Disposition   Discharge    Condition   Stable    Date/Time   Mon May 19, 2025  9:38 PM    Comment   Ritika KENYON Mary discharge to home/self care.                   Assessment & Plan       Medical Decision Making  69-year-old female presented for evaluation after getting struck by what she believes was a tennis ball while she was watching a baseball game.  Stated some kids were playing off to the side bouncing balls off a wall and it struck her.  She was wearing glasses but states the lenses popped out of the frame.  No broken glass.  She has some pain in the eye and notes some blurring of vision.    On exam she has a subconjunctival hematoma of the medial aspect of the left eye.  There is a faint crescent of hyphema at the 4:00 and 8:00 positions.  On fluorescein stain there is a slight millimeter sized abrasion at the 4 o'clock position on the cornea.  Visual acuity 20/30 on the right and 20/40 on the left corrected with her glasses.    Giving ophthalmic antibiotic ointment, advised sleeping with head of bed elevated, and referred to ophthalmology for further evaluation.    Risk  Prescription drug management.             Medications   bacitracin-polymyxin b (POLYSPORIN) ophthalmic ointment (has no administration in time range)   fluorescein sodium sterile ophthalmic strip 1 strip (1 strip Left Eye Given by Other 5/19/25 2124)   tetracaine 0.5 % ophthalmic solution 2 drop (2 drops Left Eye Given by Other 5/19/25 2124)       ED Risk Strat Scores                     No data recorded                            History of Present Illness       Chief Complaint   Patient presents with    Head Injury     Patient was at [employer] game and was hit with a tennis ball in L eye. +blurry vision. -thinners, -asa, -LOC.       Past Medical History:   Diagnosis Date    Arthritis     Colon polyp     Encounter for screening colonoscopy     Environmental allergies     Fibroid     Seasonal allergies       Past Surgical History:   Procedure Laterality Date    APPENDECTOMY      COLONOSCOPY      HYSTERECTOMY      age 45    KNEE ARTHROSCOPY Right     ME COLONOSCOPY FLX DX W/COLLJ SPEC WHEN PFRMD N/A 10/17/2017    Procedure: COLONOSCOPY;  Surgeon: Ashley Bella DO;  Location: [facility];  Service: Gastroenterology    THUMB ARTHROSCOPY      TONSILLECTOMY      WISDOM TOOTH EXTRACTION        Family History   Problem Relation Age of Onset    Stroke Mother     Hypertension Father     Heart attack Father     Melanoma Father     Prostate cancer Father 65    Melanoma Sister 57    No Known Problems Sister     No Known Problems Daughter     No Known Problems Daughter     No Known Problems Son     Lung cancer Maternal Aunt 69    No Known Problems Maternal Aunt     Prostate cancer Paternal Uncle         age unknown    No Known Problems Paternal Uncle       Social History[1]   E-Cigarette/Vaping      E-Cigarette/Vaping Substances      I have reviewed and agree with the history as documented.       History provided by:  Patient   used: No      69-year-old female presented for evaluation after getting struck by what she believes was a tennis ball while she was watching a baseball game.  Stated some kids were playing off to the side bouncing balls off a wall and it struck her.  She was wearing glasses but states the lenses popped out of the frame.  No broken glass.  She has some pain in the eye and notes some blurring of vision.      Review of Systems   Constitutional:   Negative for activity change and appetite change.   Eyes:  Positive for pain, redness and visual disturbance.   Gastrointestinal:  Negative for nausea and vomiting.   Musculoskeletal:  Negative for back pain and neck pain.   Skin:  Negative for color change and wound.   Neurological:  Negative for dizziness, weakness, light-headedness and headaches.   All other systems reviewed and are negative.          Objective       ED Triage Vitals [05/19/25 2049]   Temperature Pulse Blood Pressure Respirations SpO2 Patient Position - Orthostatic VS   98.3 °F (36.8 °C) 64 168/82 18 96 % Sitting      Temp Source Heart Rate Source BP Location FiO2 (%) Pain Score    Oral Monitor Right arm -- --      Vitals      Date and Time Temp Pulse SpO2 Resp BP Pain Score FACES Pain Rating User   05/19/25 2049 98.3 °F (36.8 °C) 64 96 % 18 168/82 -- -- MD            Physical Exam  Vitals and nursing note reviewed.   Constitutional:       Appearance: Normal appearance.   HENT:      Head: Normocephalic and atraumatic.     Eyes:      General: Lids are normal. Gaze aligned appropriately.         Right eye: No foreign body.         Left eye: No foreign body.      Extraocular Movements: Extraocular movements intact.      Pupils: Pupils are equal, round, and reactive to light.        Comments: Hyphema and abrasion.  Medial subconjunctival hemorrhage.     Musculoskeletal:      Cervical back: Normal range of motion and neck supple.     Neurological:      General: No focal deficit present.      Mental Status: She is alert and oriented to person, place, and time.      Coordination: Coordination normal.      Gait: Gait normal.     Psychiatric:         Mood and Affect: Mood normal.         Behavior: Behavior normal.         Results Reviewed       None            No orders to display       Procedures    ED Medication and Procedure Management   Prior to Admission Medications   Prescriptions Last Dose Informant Patient Reported? Taking?   ALPRAZolam (XANAX)  0.25 mg tablet   No No   Sig: Take 1 tablet PO 2 hours prior to MRI, may repeat 1 hour prior if needed pre MRI.   Cholecalciferol 25 MCG (1000 UT) capsule   Yes No   Sig: Vitamin D3 25 mcg (1,000 unit) capsule   Take by oral route 2 caps daily   Elderberry 500 MG CAPS   Yes No   Sig: Take by mouth   Multiple Vitamin (CALCIUM COMPLEX PO)   Yes No   Sig: Take 1 tablet by mouth daily   cholecalciferol (VITAMIN D3) 1,000 units tablet   Yes No   Sig: Take 1,000 Units by mouth daily   fluticasone (FLONASE) 50 mcg/act nasal spray   Yes No   Si spray into each nostril daily   fluticasone (FLONASE) 50 mcg/act nasal spray   Yes No   Sig: fluticasone propionate 50 mcg/actuation nasal spray,suspension   levocetirizine (XYZAL) 5 MG tablet   Yes No   Sig: Daily   levothyroxine 50 mcg tablet   Yes No   Sig: levothyroxine 50 mcg tablet   TAKE ONE TABLET BY MOUTH EVERY DAY   loratadine (CLARITIN) 10 mg tablet   Yes No   Sig: Take 10 mg by mouth daily   predniSONE 20 mg tablet   No No   Sig: 3 tabs PO daily x 14 days      Facility-Administered Medications: None     Patient's Medications   Discharge Prescriptions    No medications on file       ED SEPSIS DOCUMENTATION   Time reflects when diagnosis was documented in both MDM as applicable and the Disposition within this note       Time User Action Codes Description Comment    2025  9:39 PM Flako Armenta Add [S05.02XA] Corneal abrasion, left, initial encounter     2025  9:39 PM Flako Armenta Add [S05.12XA] Traumatic hyphema of left eye, initial encounter     2025 10:10 PM Flako Armenta Add [H11.32] Subconjunctival hematoma, left                      [1]   Social History  Tobacco Use    Smoking status: Never    Smokeless tobacco: Never   Substance Use Topics    Alcohol use: No    Drug use: No        Flako Armenta MD  25 [phone]

## 2025-05-20 NOTE — DISCHARGE INSTRUCTIONS
Please apply antibiotic eye ointment, small ribbon to the left lower eyelid 3 times a day until further instructed by ophthalmologist.

## 2025-05-28 ENCOUNTER — OFFICE VISIT (OUTPATIENT)
Age: 70
End: 2025-05-28
Payer: MEDICARE

## 2025-05-28 DIAGNOSIS — H21.02 HYPHEMA OF LEFT EYE: Primary | ICD-10-CM

## 2025-05-28 PROCEDURE — 99213 OFFICE O/P EST LOW 20 MIN: CPT | Performed by: OPHTHALMOLOGY

## 2025-05-28 NOTE — PROGRESS NOTES
Name: Ritika Hernandez      : 1955      MRN: [id number]  Encounter Provider: Chioma Judge MD  Encounter Date: 2025   Encounter department: [facility] OPHTHALMOLOGY  :  Assessment & Plan  Hyphema of left eye  Doing well; There is trace cell; Recommend continuing PF QID and f/u in 1 week; F/u sooner if condition worsens;                Ritika Hernandez is a 69 y.o. female who presents 1 week Hyphema OS follow up. Pt reports irritation OS, denies eye pain OS  History obtained from: patient    Review of Systems  Medical History Reviewed by provider this encounter:  Tobacco  Allergies  Meds  Problems  Med Hx  Surg Hx  Fam Hx     .     Past Ocular History: Hyphema OS  Ocular Meds/Drops:   Atropine BID OS- today at 6am  Predforte QID OU- today at 6am  Base Eye Exam       Visual Acuity (Snellen - Linear)         Right Left    Dist cc 20/25 20/25              Tonometry (Applanation, 10:09 AM)         Right Left    Pressure 11 12              Pupils         Dark APD    Right 4 -    Left 4 -              Visual Fields         Left Right     Full Full              Extraocular Movement         Right Left     Full, Ortho Full, Ortho              Neuro/Psych       Oriented x3: Yes    Mood/Affect: Normal                  Slit Lamp and Fundus Exam       External Exam         Right Left    External Normal Normal              Slit Lamp Exam         Right Left    Lids/Lashes Normal Normal    Conjunctiva/Sclera White and quiet subconj heme nasally    Cornea Clear Clear    Anterior Chamber Deep and quiet trace cell    Iris Round and reactive Round and reactive    Lens trace NSC trace NSC    Anterior Vitreous PVD PVD                      IMAGING:

## 2025-06-04 ENCOUNTER — OFFICE VISIT (OUTPATIENT)
Age: 70
End: 2025-06-04
Payer: MEDICARE

## 2025-06-04 DIAGNOSIS — H21.02 HYPHEMA OF LEFT EYE: Primary | ICD-10-CM

## 2025-06-04 DIAGNOSIS — H25.13 NUCLEAR SCLEROSIS OF BOTH EYES: ICD-10-CM

## 2025-06-04 PROCEDURE — 99213 OFFICE O/P EST LOW 20 MIN: CPT | Performed by: OPHTHALMOLOGY

## 2025-06-04 NOTE — PROGRESS NOTES
Name: Ritika Hernandez      : 1955      MRN: [id number]  Encounter Provider: Chioma Judge MD  Encounter Date: 2025   Encounter department: [facility] OPHTHALMOLOGY  :  Assessment & Plan  Hyphema of left eye  Doing well; Start taper as today's examination revealed no inflammation.  PF TID x 3 days, BID x 3 days, Q day x 3 days; F/u in 2 weeks;        Nuclear sclerosis of both eyes  Not visually significant at this time. Continue to monitor; Pt denies any difficulty with glare or decrease in vision.                 Ritika Hernandez is a 69 y.o. female who presents for Hyphema follow up.    Patient states she is using drop as directed.    Patient states eye feels scratchy.  She was getting yellow puss in corner of left eye.    History obtained from: patient    Review of Systems  Medical History Reviewed by provider this encounter:  Tobacco  Allergies  Meds  Problems  Med Hx  Surg Hx  Fam Hx     .     Past Ocular History: Hyphema OS   Ocular Meds/Drops: PredForte QID OS- 7 am.    Base Eye Exam       Visual Acuity (Snellen - Linear)         Right Left    Dist cc 20/20-1 20/20      Correction: Glasses              Tonometry (Applanation, 10:23 AM)         Right Left    Pressure 10 10              Pupils         Pupils APD    Right PERRL None    Left PERRL None              Visual Fields         Left Right     Full Full              Extraocular Movement         Right Left     Full Full              Neuro/Psych       Oriented x3: Yes    Mood/Affect: Normal                  Slit Lamp and Fundus Exam       External Exam         Right Left    External Normal Normal              Slit Lamp Exam         Right Left    Lids/Lashes Normal Normal    Conjunctiva/Sclera White and quiet subconj heme nasally    Cornea Clear Clear    Anterior Chamber Deep and quiet no cell    Iris Round and reactive Round and reactive    Lens trace NSC trace NSC    Anterior Vitreous PVD PVD                      IMAGING:

## 2025-06-12 ENCOUNTER — HOSPITAL ENCOUNTER (OUTPATIENT)
Dept: MAMMOGRAPHY | Facility: HOSPITAL | Age: 70
Discharge: HOME | End: 2025-06-12
Attending: INTERNAL MEDICINE
Payer: MEDICARE

## 2025-06-12 VITALS — WEIGHT: 156.97 LBS | HEIGHT: 64 IN | BODY MASS INDEX: 26.8 KG/M2

## 2025-06-12 PROCEDURE — 77067 SCR MAMMO BI INCL CAD: CPT

## 2025-06-12 PROCEDURE — 77063 BREAST TOMOSYNTHESIS BI: CPT

## 2025-06-18 ENCOUNTER — OFFICE VISIT (OUTPATIENT)
Age: 70
End: 2025-06-18
Payer: MEDICARE

## 2025-06-18 DIAGNOSIS — H25.13 NUCLEAR SCLEROSIS OF BOTH EYES: ICD-10-CM

## 2025-06-18 DIAGNOSIS — H21.02 HYPHEMA OF LEFT EYE: Primary | ICD-10-CM

## 2025-06-18 PROCEDURE — 99213 OFFICE O/P EST LOW 20 MIN: CPT | Performed by: OPHTHALMOLOGY

## 2025-06-18 NOTE — PROGRESS NOTES
Name: Ritika Hernandez      : 1955      MRN: [id number]  Encounter Provider: Vin Manzo MD  Encounter Date: 2025   Encounter department: [facility] OPHTHALMOLOGY  :  Assessment & Plan  Hyphema of left eye         Nuclear sclerosis of both eyes             Ritika Hernandez is a 69 y.o. female who presents for recheck of Hyphema, OS.    Reports vision is fine OS, and more comfortable.thoguh still mild tearing.  No pain. No light sensitivity.    History obtained from: patient    Review of Systems  Medical History Reviewed by provider this encounter:     .     Past Ocular History: NS, Hyphema OS    Ocular Meds/Drops: none    Base Eye Exam       Visual Acuity (Snellen - Linear)         Right Left    Dist cc 20/20- 20/20      Correction: Glasses              Tonometry (Tonopen, 8:35 AM)         Right Left    Pressure 13 14              Pupils         Pupils    Right PERRL    Left PERRL              Visual Fields         Left Right     Full Full              Extraocular Movement         Right Left     Full Full              Neuro/Psych       Oriented x3: Yes    Mood/Affect: Normal                  Slit Lamp and Fundus Exam       External Exam         Right Left    External Normal Normal              Slit Lamp Exam         Right Left    Lids/Lashes Normal Normal    Conjunctiva/Sclera White and quiet White and quiet    Cornea Clear Clear    Anterior Chamber Deep and quiet Deep and quiet no RBCs, no layered hyphema (all clear)    Iris Round and reactive Round and reactive    Lens trace NSC trace NSC    Anterior Vitreous PVD PVD                      IMAGING:none today    IMP:  Encounter Diagnoses   Name Primary?    Hyphema of left eye Yes    Nuclear sclerosis of both eyes      H&P, ROS, Meds, pertinent hx reviewed.  Pertinent findings dw pt and questions answered    Overall looks cleared and pt doing well    REC:  Releast to local/previous eye care  Pres free AT prn  Immed fU if any issues.